# Patient Record
Sex: FEMALE | Race: WHITE | NOT HISPANIC OR LATINO | Employment: FULL TIME | URBAN - METROPOLITAN AREA
[De-identification: names, ages, dates, MRNs, and addresses within clinical notes are randomized per-mention and may not be internally consistent; named-entity substitution may affect disease eponyms.]

---

## 2017-01-10 DIAGNOSIS — K21.9 GASTROESOPHAGEAL REFLUX DISEASE, ESOPHAGITIS PRESENCE NOT SPECIFIED: Primary | ICD-10-CM

## 2017-01-10 NOTE — TELEPHONE ENCOUNTER
omeprazole (PRILOSEC) 20 MG CR capsule      Last Written Prescription Date: 12-9-2016  Last Fill Quantity: 30,  # refills: 0   Last Office Visit with [facility], [facility] or [facility] prescribing provider: 9-

## 2017-01-13 NOTE — TELEPHONE ENCOUNTER
My chart message sent for patient to schedule an apt anup period already given.  Jo Parikh,Clinic Rn  [facility]

## 2017-02-03 ENCOUNTER — OFFICE VISIT (OUTPATIENT)
Dept: FAMILY MEDICINE | Facility: CLINIC | Age: 42
End: 2017-02-03
Payer: COMMERCIAL

## 2017-02-03 VITALS
RESPIRATION RATE: 20 BRPM | HEART RATE: 76 BPM | BODY MASS INDEX: 32.58 KG/M2 | HEIGHT: 67 IN | WEIGHT: 207.6 LBS | TEMPERATURE: 98.4 F | DIASTOLIC BLOOD PRESSURE: 74 MMHG | SYSTOLIC BLOOD PRESSURE: 126 MMHG

## 2017-02-03 DIAGNOSIS — E66.09 NON MORBID OBESITY DUE TO EXCESS CALORIES: ICD-10-CM

## 2017-02-03 DIAGNOSIS — F17.200 TOBACCO USE DISORDER: ICD-10-CM

## 2017-02-03 DIAGNOSIS — K21.9 GASTROESOPHAGEAL REFLUX DISEASE, ESOPHAGITIS PRESENCE NOT SPECIFIED: Primary | ICD-10-CM

## 2017-02-03 DIAGNOSIS — Z23 NEED FOR HEPATITIS A AND B VACCINATION: ICD-10-CM

## 2017-02-03 PROCEDURE — 99213 OFFICE O/P EST LOW 20 MIN: CPT | Mod: 25 | Performed by: PHYSICIAN ASSISTANT

## 2017-02-03 PROCEDURE — 90471 IMMUNIZATION ADMIN: CPT | Performed by: PHYSICIAN ASSISTANT

## 2017-02-03 PROCEDURE — 90636 HEP A/HEP B VACC ADULT IM: CPT | Performed by: PHYSICIAN ASSISTANT

## 2017-02-03 NOTE — NURSING NOTE
"Chief Complaint   Patient presents with     Recheck Medication     Weight Problem     Patient wondering if you'll prescribe medication to help with weight loss-  Phentermine     Travel Clinic     Travel questions and immunizations       Initial /74 mmHg  Pulse 76  Temp(Src) 98.4  F (36.9  C) (Oral)  Resp 20  Ht 5' 6.5\" (1.689 m)  Wt 207 lb 9.6 oz (94.167 kg)  BMI 33.01 kg/m2  LMP 01/20/2017 Estimated body mass index is 33.01 kg/(m^2) as calculated from the following:    Height as of this encounter: 5' 6.5\" (1.689 m).    Weight as of this encounter: 207 lb 9.6 oz (94.167 kg).  BP completed using cuff size: large    "

## 2017-02-03 NOTE — PROGRESS NOTES
"  SUBJECTIVE:                                                    Tamika Chapin is a 41 year old female who presents to clinic today for the following health issues:      Medication Followup of Omeprazole    Taking Medication as prescribed: yes    Side Effects:  None    Medication Helping Symptoms:  Yes    Taken everyday pretty consistently since visit 9/2016. She states it is very helpful. When she has tried stopping the medication, her symptoms return.        Patient has long history of smoking.She wants to try to quit at the end of this month.  SHe is interested in using the quit plan.    Patient is [place] of [facility] in the next 1 month.    Patient is interested in using medication, phentermine, for weight loss. Her spouse is using and it helped him kick start his diet.    -------------------------------------    Problem list, Medication list, Allergies, and Medical/Social/Surgical histories reviewed in EPIC and updated as appropriate.    ROS:  A pertinent ROS of the General  HEENT  Cardiovascular  Pulmonary  GI systems is otherwise unremarkable.      OBJECTIVE:                                                    /74 mmHg  Pulse 76  Temp(Src) 98.4  F (36.9  C) (Oral)  Resp 20  Ht 5' 6.5\" (1.689 m)  Wt 207 lb 9.6 oz (94.167 kg)  BMI 33.01 kg/m2  LMP 01/20/2017 .  GENERAL: healthy, alert and no distress    Diagnostic test results:  Diagnostic Test Results:  none      ASSESSMENT/PLAN:                                                        1. Gastroesophageal reflux disease, esophagitis presence not specified  Patient Instructions   1. GERD (Acid reflux)  Try switching to over-the-counter Zantac 75 mg twice daily. If this doesn't help and you end up quitting smoking and this doesn't help then I would recommend a test called an endoscopy. (You may call insurance to see about cost).  - Consider dietary changes:   No chocolate, heavy dairy products, red meat, fried foods, alcohol, " caffeine, soda, smoking  - omeprazole (PRILOSEC) 20 MG CR capsule; Take 1 capsule (20 mg) by mouth daily  Dispense: 30 capsule; Refill: 0    2. Non morbid obesity due to excess calories  3. Phentermine   Please check with insurance on coverage, but see Dr. Wiley Stock [facility] Medicine:  [phone]     3. Tobacco use disorder  Not ready to quit today, will Let me know when/if you would like QUIT  Plan referral      4. Need for hepatitis A and B vaccination  - HEPA/HEPB VACCINE ADULT IM    Follow up with Provider - 1 month.         Susan Griffin PA-C  [facility]

## 2017-02-03 NOTE — PATIENT INSTRUCTIONS
1. GERD (Acid reflux)  Try switching to over-the-counter Zantac 75 mg twice daily. If this doesn't help and you end up quitting smoking and this doesn't help then I would recommend a test called an endoscopy. (You may call insurance to see about cost).  - Consider dietary changes:   No chocolate, heavy dairy products, red meat, fried foods, alcohol, caffeine, soda, smoking    2. Smoking  Let me know when/if you would like QUIT  Plan referral    3. Phentermine   Please check with insurance on coverage, but see Dr. Wiley Stock [facility] Medicine:  [phone]

## 2017-02-03 NOTE — MR AVS SNAPSHOT
After Visit Summary   2/3/2017    Tamika Chapin    MRN: [id number]           Patient Information     Date Of Birth          1975        Visit Information        Provider Department      2/3/2017 11:00 AM Susan Griffin PA-C [facility]        Today's Diagnoses     Gastroesophageal reflux disease, esophagitis presence not specified    -  1       Care Instructions    1. GERD (Acid reflux)  Try switching to over-the-counter Zantac 75 mg twice daily. If this doesn't help and you end up quitting smoking and this doesn't help then I would recommend a test called an endoscopy. (You may call insurance to see about cost).  - Consider dietary changes:   No chocolate, heavy dairy products, red meat, fried foods, alcohol, caffeine, soda, smoking    2. Smoking  Let me know when/if you would like QUIT  Plan referral    3. Phentermine   Please check with insurance on coverage, but see Dr. Wiley Stock [facility] Medicine:  [phone]         Follow-ups after your visit        Who to contact     If you have questions or need follow up information about today's clinic visit or your schedule please contact [facility] directly at [phone].  Normal or non-critical lab and imaging results will be communicated to you by MyChart, letter or phone within 4 business days after the clinic has received the results. If you do not hear from us within 7 days, please contact the clinic through [employer] or phone. If you have a critical or abnormal lab result, we will notify you by phone as soon as possible.  Submit refill requests through [employer] or call your pharmacy and they will forward the refill request to us. Please allow 3 business days for your refill to be completed.          Additional Information About Your Visit        [employer] Information     [employer] gives you secure access to your electronic health record. If you see a primary care provider, you can also send messages to your  "care team and make appointments. If you have questions, please call your primary care clinic.  If you do not have a primary care provider, please call [phone] and they will assist you.        Care EveryWhere ID     This is your Care EveryWhere ID. This could be used by other organizations to access your [place] medical records  [id number]        Your Vitals Were     Pulse Temperature Respirations Height BMI (Body Mass Index) Last Period    76 98.4  F (36.9  C) (Oral) 20 5' 6.5\" (1.689 m) 33.01 kg/m2 01/20/2017       Blood Pressure from Last 3 Encounters:   02/03/17 126/74   09/26/16 124/72   02/02/16 106/75    Weight from Last 3 Encounters:   02/03/17 207 lb 9.6 oz (94.167 kg)   09/26/16 221 lb (100.245 kg)   10/02/15 218 lb (98.884 kg)              Today, you had the following     No orders found for display       Primary Care Provider Office Phone # Fax #    Susan Griffin PA-C [phone]       [facility] [address] [place] [address]        Thank you!     Thank you for choosing [facility]  for your care. Our goal is always to provide you with excellent care. Hearing back from our patients is one way we can continue to improve our services. Please take a few minutes to complete the written survey that you may receive in the mail after your visit with us. Thank you!             Your Updated Medication List - Protect others around you: Learn how to safely use, store and throw away your medicines at www.disposemymeds.org.          This list is accurate as of: 2/3/17 11:55 AM.  Always use your most recent med list.                   Brand Name Dispense Instructions for use    omeprazole 20 MG CR capsule    priLOSEC    30 capsule    Take 1 capsule (20 mg) by mouth daily **Due for follow up visit for further refills**         "

## 2017-05-09 ENCOUNTER — TELEPHONE (OUTPATIENT)
Dept: FAMILY MEDICINE | Facility: CLINIC | Age: 42
End: 2017-05-09

## 2017-05-09 DIAGNOSIS — K21.9 GASTROESOPHAGEAL REFLUX DISEASE, ESOPHAGITIS PRESENCE NOT SPECIFIED: ICD-10-CM

## 2017-05-09 DIAGNOSIS — R10.13 ABDOMINAL PAIN, EPIGASTRIC: Primary | ICD-10-CM

## 2017-05-09 NOTE — TELEPHONE ENCOUNTER
Reason for Call: Request for an order or referral:    Order or referral being requested: Endoscopy    Date needed: as soon as possible    Has the patient been seen by the PCP for this problem? YES    Additional comments: Patient states that at her 02/2017 visit with Susan Griffin PA-C, Susan suggested that patient may want to pursue endoscopy. Patient did try taking the Zantac as suggested and it didn't help much. Patient has not quit smoking. She has been feeling more pain in her upper abdomen, has had more reflux, burping, and definitely correlates with after she eats food.     Phone number Patient can be reached at:  Home number on file [phone] (home)    Best Time:  any    Can we leave a detailed message on this number?  YES    Call taken on 5/9/2017 at 12:16 PM by Vickie Boudreaux

## 2017-05-09 NOTE — TELEPHONE ENCOUNTER
Route for a GI referral for a endoscopy. 2/3 OV mentions this if no improvement. (If a visit is requested, please let me know what type or when you'd like to chat with her.)  Zunilda Kiser RN

## 2017-05-10 NOTE — TELEPHONE ENCOUNTER
Endoscopy referral placed. She should plan to follow up with alternative provider after the procedure as PCP will be out    BUTCH Espinosa, PA-C  [facility]

## 2017-06-16 ENCOUNTER — TRANSFERRED RECORDS (OUTPATIENT)
Dept: HEALTH INFORMATION MANAGEMENT | Facility: CLINIC | Age: 42
End: 2017-06-16

## 2018-07-02 ENCOUNTER — HEALTH MAINTENANCE LETTER (OUTPATIENT)
Age: 43
End: 2018-07-02

## 2019-09-17 ENCOUNTER — DOCUMENTATION ONLY (OUTPATIENT)
Dept: LAB | Facility: CLINIC | Age: 44
End: 2019-09-17

## 2019-09-17 DIAGNOSIS — Z13.29 SCREENING FOR THYROID DISORDER: ICD-10-CM

## 2019-09-17 DIAGNOSIS — Z13.6 CARDIOVASCULAR SCREENING; LDL GOAL LESS THAN 160: ICD-10-CM

## 2019-09-17 DIAGNOSIS — Z00.00 ENCOUNTER FOR ROUTINE ADULT HEALTH EXAMINATION WITHOUT ABNORMAL FINDINGS: Primary | ICD-10-CM

## 2019-09-17 DIAGNOSIS — R79.89 ELEVATED LFTS: ICD-10-CM

## 2019-09-17 DIAGNOSIS — Z13.1 SCREENING FOR DIABETES MELLITUS (DM): ICD-10-CM

## 2019-09-17 NOTE — PROGRESS NOTES
Next 5 appointments (look out 90 days)    Sep 19, 2019  9:10 AM CDT  PHYSICAL with LIV Heath CNP  [facility] ([facility] [id number] [place] [id number]  [phone]

## 2019-09-19 ENCOUNTER — OFFICE VISIT (OUTPATIENT)
Dept: PEDIATRICS | Facility: CLINIC | Age: 44
End: 2019-09-19
Payer: COMMERCIAL

## 2019-09-19 ENCOUNTER — RESULT FOLLOW UP (OUTPATIENT)
Dept: PEDIATRICS | Facility: CLINIC | Age: 44
End: 2019-09-19

## 2019-09-19 VITALS
HEIGHT: 67 IN | BODY MASS INDEX: 34.28 KG/M2 | DIASTOLIC BLOOD PRESSURE: 60 MMHG | HEART RATE: 85 BPM | WEIGHT: 218.4 LBS | SYSTOLIC BLOOD PRESSURE: 100 MMHG | TEMPERATURE: 97.1 F | OXYGEN SATURATION: 98 %

## 2019-09-19 DIAGNOSIS — Z00.00 ROUTINE GENERAL MEDICAL EXAMINATION AT A HEALTH CARE FACILITY: Primary | ICD-10-CM

## 2019-09-19 DIAGNOSIS — R87.810 CERVICAL HIGH RISK HPV (HUMAN PAPILLOMAVIRUS) TEST POSITIVE: ICD-10-CM

## 2019-09-19 DIAGNOSIS — Z13.1 SCREENING FOR DIABETES MELLITUS (DM): ICD-10-CM

## 2019-09-19 DIAGNOSIS — R35.0 URINARY FREQUENCY: ICD-10-CM

## 2019-09-19 DIAGNOSIS — Z13.0 SCREENING FOR DISORDER OF BLOOD AND BLOOD-FORMING ORGANS: ICD-10-CM

## 2019-09-19 DIAGNOSIS — F17.218 CIGARETTE NICOTINE DEPENDENCE WITH OTHER NICOTINE-INDUCED DISORDER: ICD-10-CM

## 2019-09-19 DIAGNOSIS — Z12.4 SCREENING FOR MALIGNANT NEOPLASM OF CERVIX: ICD-10-CM

## 2019-09-19 DIAGNOSIS — R19.5 LOOSE STOOLS: ICD-10-CM

## 2019-09-19 DIAGNOSIS — R32 URINARY INCONTINENCE, UNSPECIFIED TYPE: ICD-10-CM

## 2019-09-19 DIAGNOSIS — Z13.6 CARDIOVASCULAR SCREENING; LDL GOAL LESS THAN 160: ICD-10-CM

## 2019-09-19 DIAGNOSIS — R82.90 NONSPECIFIC FINDING ON EXAMINATION OF URINE: ICD-10-CM

## 2019-09-19 DIAGNOSIS — Z12.39 ENCOUNTER FOR SCREENING BREAST EXAMINATION: ICD-10-CM

## 2019-09-19 DIAGNOSIS — Z00.00 ENCOUNTER FOR ROUTINE ADULT HEALTH EXAMINATION WITHOUT ABNORMAL FINDINGS: ICD-10-CM

## 2019-09-19 DIAGNOSIS — Z13.29 SCREENING FOR THYROID DISORDER: ICD-10-CM

## 2019-09-19 LAB
ALBUMIN SERPL-MCNC: 3.8 G/DL (ref 3.4–5)
ALBUMIN UR-MCNC: NEGATIVE MG/DL
ALP SERPL-CCNC: 94 U/L (ref 40–150)
ALT SERPL W P-5'-P-CCNC: 57 U/L (ref 0–50)
ANION GAP SERPL CALCULATED.3IONS-SCNC: 7 MMOL/L (ref 3–14)
APPEARANCE UR: CLEAR
AST SERPL W P-5'-P-CCNC: 34 U/L (ref 0–45)
BACTERIA #/AREA URNS HPF: ABNORMAL /HPF
BILIRUB SERPL-MCNC: 0.8 MG/DL (ref 0.2–1.3)
BILIRUB UR QL STRIP: NEGATIVE
BUN SERPL-MCNC: 12 MG/DL (ref 7–30)
CALCIUM SERPL-MCNC: 8.6 MG/DL (ref 8.5–10.1)
CHLORIDE SERPL-SCNC: 107 MMOL/L (ref 94–109)
CHOLEST SERPL-MCNC: 206 MG/DL
CO2 SERPL-SCNC: 26 MMOL/L (ref 20–32)
COLOR UR AUTO: ABNORMAL
CREAT SERPL-MCNC: 0.74 MG/DL (ref 0.52–1.04)
DEPRECATED CALCIDIOL+CALCIFEROL SERPL-MC: 24 UG/L (ref 20–75)
ERYTHROCYTE [DISTWIDTH] IN BLOOD BY AUTOMATED COUNT: 12.9 % (ref 10–15)
GFR SERPL CREATININE-BSD FRML MDRD: >90 ML/MIN/{1.73_M2}
GLUCOSE SERPL-MCNC: 113 MG/DL (ref 70–99)
GLUCOSE UR STRIP-MCNC: NEGATIVE MG/DL
HCT VFR BLD AUTO: 40.6 % (ref 35–47)
HDLC SERPL-MCNC: 38 MG/DL
HGB BLD-MCNC: 13.2 G/DL (ref 11.7–15.7)
HGB UR QL STRIP: NEGATIVE
KETONES UR STRIP-MCNC: NEGATIVE MG/DL
LDLC SERPL CALC-MCNC: 133 MG/DL
LEUKOCYTE ESTERASE UR QL STRIP: NEGATIVE
MCH RBC QN AUTO: 28.9 PG (ref 26.5–33)
MCHC RBC AUTO-ENTMCNC: 32.5 G/DL (ref 31.5–36.5)
MCV RBC AUTO: 89 FL (ref 78–100)
NITRATE UR QL: NEGATIVE
NON-SQ EPI CELLS #/AREA URNS LPF: ABNORMAL /LPF
NONHDLC SERPL-MCNC: 168 MG/DL
PH UR STRIP: 5.5 PH (ref 5–7)
PLATELET # BLD AUTO: 297 10E9/L (ref 150–450)
POTASSIUM SERPL-SCNC: 3.7 MMOL/L (ref 3.4–5.3)
PROT SERPL-MCNC: 7.4 G/DL (ref 6.8–8.8)
RBC # BLD AUTO: 4.56 10E12/L (ref 3.8–5.2)
RBC #/AREA URNS AUTO: ABNORMAL /HPF
SODIUM SERPL-SCNC: 140 MMOL/L (ref 133–144)
SOURCE: ABNORMAL
SP GR UR STRIP: 1 (ref 1–1.03)
TRIGL SERPL-MCNC: 174 MG/DL
TSH SERPL DL<=0.005 MIU/L-ACNC: 1.19 MU/L (ref 0.4–4)
UROBILINOGEN UR STRIP-MCNC: NORMAL MG/DL (ref 0–2)
WBC # BLD AUTO: 8.3 10E9/L (ref 4–11)
WBC #/AREA URNS AUTO: ABNORMAL /HPF

## 2019-09-19 PROCEDURE — 85027 COMPLETE CBC AUTOMATED: CPT | Performed by: NURSE PRACTITIONER

## 2019-09-19 PROCEDURE — G0123 SCREEN CERV/VAG THIN LAYER: HCPCS | Performed by: NURSE PRACTITIONER

## 2019-09-19 PROCEDURE — 81001 URINALYSIS AUTO W/SCOPE: CPT | Performed by: NURSE PRACTITIONER

## 2019-09-19 PROCEDURE — 36415 COLL VENOUS BLD VENIPUNCTURE: CPT | Performed by: NURSE PRACTITIONER

## 2019-09-19 PROCEDURE — 87624 HPV HI-RISK TYP POOLED RSLT: CPT | Performed by: NURSE PRACTITIONER

## 2019-09-19 PROCEDURE — 82306 VITAMIN D 25 HYDROXY: CPT | Performed by: NURSE PRACTITIONER

## 2019-09-19 PROCEDURE — 80061 LIPID PANEL: CPT | Performed by: NURSE PRACTITIONER

## 2019-09-19 PROCEDURE — 87086 URINE CULTURE/COLONY COUNT: CPT | Performed by: NURSE PRACTITIONER

## 2019-09-19 PROCEDURE — 84443 ASSAY THYROID STIM HORMONE: CPT | Performed by: NURSE PRACTITIONER

## 2019-09-19 PROCEDURE — 99396 PREV VISIT EST AGE 40-64: CPT | Performed by: NURSE PRACTITIONER

## 2019-09-19 PROCEDURE — 83516 IMMUNOASSAY NONANTIBODY: CPT | Mod: 59 | Performed by: NURSE PRACTITIONER

## 2019-09-19 PROCEDURE — 99213 OFFICE O/P EST LOW 20 MIN: CPT | Mod: 25 | Performed by: NURSE PRACTITIONER

## 2019-09-19 PROCEDURE — 80053 COMPREHEN METABOLIC PANEL: CPT | Performed by: NURSE PRACTITIONER

## 2019-09-19 RX ORDER — BUPROPION HYDROCHLORIDE 150 MG/1
150 TABLET, EXTENDED RELEASE ORAL 2 TIMES DAILY
Qty: 60 TABLET | Refills: 6 | Status: SHIPPED | OUTPATIENT
Start: 2019-09-19 | End: 2020-10-22

## 2019-09-19 ASSESSMENT — MIFFLIN-ST. JEOR: SCORE: 1670.35

## 2019-09-19 NOTE — NURSING NOTE
"Chief Complaint   Patient presents with     Physical     AFE       Initial /60 (BP Location: Right arm, Patient Position: Sitting, Cuff Size: Adult Regular)   Pulse 85   Temp 97.1  F (36.2  C) (Temporal)   Ht 1.689 m (5' 6.5\")   Wt 99.1 kg (218 lb 6.4 oz)   LMP  (LMP Unknown)   SpO2 98%   Breastfeeding? No   BMI 34.72 kg/m   Estimated body mass index is 34.72 kg/m  as calculated from the following:    Height as of this encounter: 1.689 m (5' 6.5\").    Weight as of this encounter: 99.1 kg (218 lb 6.4 oz).  Medication Reconciliation: complete      LUIS FELIPE William      "

## 2019-09-19 NOTE — PROGRESS NOTES
SUBJECTIVE:   CC: Tamika Chapin is an 44 year old woman who presents for preventive health visit.     Healthy Habits:    Do you get at least three servings of calcium containing foods daily (dairy, green leafy vegetables, etc.)? yes    Amount of exercise or daily activities, outside of work: 10 mins daily    Problems taking medications regularly not applicable    Medication side effects: No    Have you had an eye exam in the past two years? yes    Do you see a dentist twice per year? yes    Do you have sleep apnea, excessive snoring or daytime drowsiness?yes      1. Discuss vitamin profile.one of her sisters mentioned a vitamin profile   2. Smoking cessation. Has done Chantix in the past. Has not zyban before   3. Discuss IBS symptoms, has been ongoing for several years    Acute Illness   Acute illness concerns: cough  Onset: 1 week    Fever: no    Chills/Sweats: no    Headache (location?): no    Sinus Pressure:no    Conjunctivitis:  no    Ear Pain: no    Rhinorrhea: no    Congestion: YES    Sore Throat: no     Cough: YES-productive of clear sputum, productive of yellow sputum    Wheeze: no    Decreased Appetite: no    Nausea: no    Vomiting: no    Diarrhea:  no    Dysuria/Freq.: no    Fatigue/Achiness: no    Sick/Strep Exposure: no     Therapies Tried and outcome: nyquil      Today's PHQ-2 Score:   PHQ-2 ( 1999 Pfizer) 9/19/2019 9/26/2016   Q1: Little interest or pleasure in doing things 0 0   Q2: Feeling down, depressed or hopeless 0 0   PHQ-2 Score 0 0       Abuse: Current or Past(Physical, Sexual or Emotional)- No  Do you feel safe in your environment? Yes    Social History     Tobacco Use     Smoking status: Current Every Day Smoker     Packs/day: 0.50     Years: 23.00     Pack years: 11.50     Types: Cigarettes     Smokeless tobacco: Never Used     Tobacco comment: 1 cig occas   Substance Use Topics     Alcohol use: Yes     Comment: Occ. few times a month - couple of drinks     If you drink alcohol do you  typically have >3 drinks per day or >7 drinks per week? No                     Reviewed orders with patient.  Reviewed health maintenance and updated orders accordingly - Yes  Lab work is in process  Labs reviewed in EPIC  BP Readings from Last 3 Encounters:   09/19/19 100/60   02/03/17 126/74   09/26/16 124/72    Wt Readings from Last 3 Encounters:   09/19/19 99.1 kg (218 lb 6.4 oz)   02/03/17 94.2 kg (207 lb 9.6 oz)   09/26/16 100.2 kg (221 lb)                  Patient Active Problem List   Diagnosis     Tobacco use disorder     Hyperlipidemia LDL goal <160     ASCUS on Pap smear     Obesity     Past Surgical History:   Procedure Laterality Date     C APPENDECTOMY  07/1995       Social History     Tobacco Use     Smoking status: Current Every Day Smoker     Packs/day: 0.50     Years: 23.00     Pack years: 11.50     Types: Cigarettes     Smokeless tobacco: Never Used     Tobacco comment: 1 cig occas   Substance Use Topics     Alcohol use: Yes     Comment: Occ. few times a month - couple of drinks     Family History   Problem Relation Age of Onset     Diabetes Father      Hypertension Father      Respiratory Maternal Grandmother         emphysema     Diabetes Maternal Grandmother      Cancer Paternal Grandmother         lung     Other Cancer Paternal Grandmother      Depression Sister          Current Outpatient Medications   Medication Sig Dispense Refill     omeprazole (PRILOSEC) 20 MG CR capsule Take 1 capsule (20 mg) by mouth daily 30 capsule 0     No Known Allergies    Mammogram Screening: Patient under age 50, mutual decision reflected in health maintenance.    Pertinent mammograms are reviewed under the imaging tab.  History of abnormal Pap smear: NO - age 30- 65 PAP every 3 years recommended  PAP / HPV Latest Ref Rng & Units 9/19/2019 4/30/2015 2/5/2014   PAP - NIL NIL NIL   HPV 16 DNA NEG:Negative Negative Negative -   HPV 18 DNA NEG:Negative Negative Negative -   OTHER HR HPV NEG:Negative Positive(A)  Negative -     Reviewed and updated as needed this visit by clinical staff  Tobacco  Allergies  Meds  Med Hx  Surg Hx  Fam Hx  Soc Hx        Reviewed and updated as needed this visit by Provider        Past Medical History:   Diagnosis Date     Cervical high risk HPV (human papillomavirus) test positive 09/19/2019    See problem list      Past Surgical History:   Procedure Laterality Date     C APPENDECTOMY  07/1995       ROS:  CONSTITUTIONAL:NEGATIVE for fever, chills, change in weight  INTEGUMENTARY/SKIN: NEGATIVE for worrisome rashes, moles or lesions  EYES: NEGATIVE for vision changes or irritation  ENT: POSITIVE for nasal congestion and postnasal drainage and NEGATIVE for fever, sinus pressure, sore throat and vertigo  Upper URI going on about a week but seems to be getting better.   RESP:POSITIVE for cough-non productive and NEGATIVE for hemoptysis, Hx asthma, SOB/dyspnea and wheezing  BREAST: NEGATIVE for masses, tenderness or discharge  CV: NEGATIVE for chest pain, palpitations or peripheral edema  GI: POSITIVE for states feels like urgency issues and will not be able to get to the bathroom at times. Has been going on for years.  and NEGATIVE for jaundice, melena, nausea, vomiting and weight loss   female: normal menses, no unusual vaginal symptoms and does have urinary urgency and frequency for years   Has not tried nothing and has never had any children   MUSCULOSKELETAL:POSITIVE  for joint pain in both knees intermittently. Does not give out from under her.  and NEGATIVE for joint swelling  and joint warmth   NEURO: NEGATIVE for weakness, dizziness or paresthesias  ENDOCRINE: NEGATIVE for temperature intolerance, skin/hair changes  HEME/ALLERGY/IMMUNE: NEGATIVE for bleeding problems  PSYCHIATRIC: POSITIVE forHx anxiety and insomnia and seems to be aggravated by her work.  and NEGATIVE foralcohol abuse, drug usage, thoughts of hurting someone else and thoughts of self harm     OBJECTIVE:   BP  "100/60 (BP Location: Right arm, Patient Position: Sitting, Cuff Size: Adult Regular)   Pulse 85   Temp 97.1  F (36.2  C) (Temporal)   Ht 1.689 m (5' 6.5\")   Wt 99.1 kg (218 lb 6.4 oz)   LMP  (LMP Unknown)   SpO2 98%   Breastfeeding? No   BMI 34.72 kg/m     Wt Readings from Last 4 Encounters:   09/19/19 99.1 kg (218 lb 6.4 oz)   02/03/17 94.2 kg (207 lb 9.6 oz)   09/26/16 100.2 kg (221 lb)   10/02/15 98.9 kg (218 lb)       EXAM:  GENERAL: healthy, alert and no distress  EYES: Eyes grossly normal to inspection and conjunctivae and sclerae normal  HENT: normal cephalic/atraumatic, ear canals and TM's normal, nose and mouth without ulcers or lesions, rhinorrhea clear, oropharynx clear and oral mucous membranes moist  NECK: no adenopathy, no asymmetry, masses, or scars and thyroid normal to palpation  RESP: lungs clear to auscultation - no rales, rhonchi or wheezes  BREAST: normal without masses, tenderness or nipple discharge and no palpable axillary masses or adenopathy  CV: regular rates and rhythm, no murmur, click or rub, peripheral pulses strong and no peripheral edema  ABDOMEN: soft, nontender, no hepatosplenomegaly, no masses and bowel sounds normal   (female): normal female external genitalia, normal urethral meatus, vaginal mucosa pink, moist, well rugated, and normal cervix/adnexa/uterus without masses or discharge  MS: no gross musculoskeletal defects noted, no edema  SKIN: no suspicious lesions or rashes  NEURO: Normal strength and tone, mentation intact and speech normal  PSYCH: mentation appears normal, affect normal/bright  LYMPH: no cervical, supraclavicular, axillary, or inguinal adenopathy    Diagnostic Test Results:  Results for orders placed or performed in visit on 09/19/19   UA with Microscopic reflex to Culture   Result Value Ref Range    Color Urine Straw     Appearance Urine Clear     Glucose Urine Negative NEG^Negative mg/dL    Bilirubin Urine Negative NEG^Negative    Ketones Urine " Negative NEG^Negative mg/dL    Specific Gravity Urine 1.001 (L) 1.003 - 1.035    Blood Urine Negative NEG^Negative    pH Urine 5.5 5.0 - 7.0 pH    Protein Albumin Urine Negative NEG^Negative mg/dL    Urobilinogen mg/dL Normal 0.0 - 2.0 mg/dL    Nitrite Urine Negative NEG^Negative    Leukocyte Esterase Urine Negative NEG^Negative    Source Midstream Urine     WBC Urine 0 - 5 OTO5^0 - 5 /HPF    RBC Urine O - 2 OTO2^O - 2 /HPF    Bacteria Urine Moderate (A) NEG^Negative /HPF    Squamous Epithelial /LPF Urine Few FEW^Few /LPF   HPV High Risk Types DNA Cervical   Result Value Ref Range    HPV Source [facility]     HPV 16 DNA Negative NEG^Negative    HPV 18 DNA Negative NEG^Negative    Other HR HPV Positive (A) NEG^Negative    Final Diagnosis       This patient's sample is positive for other HR HPV DNA (types 31, 33, 35, 39, 45, 51, 52,   56, 58, 59, 66 or 68), not HPV 16 or HPV 18 DNA. This result requires clinical correlation   with concurrent cytology findings.      Specimen Description Cervical Cells    Vitamin D Deficiency   Result Value Ref Range    Vitamin D Deficiency screening 24 20 - 75 ug/L   CBC with platelets   Result Value Ref Range    WBC 8.3 4.0 - 11.0 10e9/L    RBC Count 4.56 3.8 - 5.2 10e12/L    Hemoglobin 13.2 11.7 - 15.7 g/dL    Hematocrit 40.6 35.0 - 47.0 %    MCV 89 78 - 100 fl    MCH 28.9 26.5 - 33.0 pg    MCHC 32.5 31.5 - 36.5 g/dL    RDW 12.9 10.0 - 15.0 %    Platelet Count 297 150 - 450 10e9/L   Tissue transglutaminase gaurav IgA and IgG   Result Value Ref Range    Tissue Transglutaminase Antibody IgA 1 <7 U/mL    Tissue Transglutaminase Gaurav IgG <1 <7 U/mL   A pap thin layer screen   Result Value Ref Range    PAP NIL     Copath Report         Patient Name: RAMESH STONE  MR#: [id number]  Specimen #: [id number]  Collected: 9/19/2019  Received: 9/19/2019  Reported: 9/24/2019 08:01  Ordering Phy(s): HAWA NAGEL    For improved result formatting, select 'View Enhanced Report Format' under    Linked Documents section.    SPECIMEN/STAIN PROCESS:  Pap thin layer prep screening ([facility])       Pap-Cyto x 1, HPV ordered x 1    SOURCE: Cervical, endocervical  ----------------------------------------------------------------   Pap thin layer prep screening ([facility])  SPECIMEN ADEQUACY:  Satisfactory for evaluation.  Specimen was unable to be imaged on the   [employer] Slide .  -Transformation zone component absent.    CYTOLOGIC INTERPRETATION:    Negative for intraepithelial lesion or malignancy    Electronically signed out by:  VIOLET Stanton (ASCP)    CLINICAL HISTORY:    A previous normal pap  Date of Last Pap: 4/30/2015,    Papanicolaou Test Limitations:  Cervical cytology is a screening test with   limi keaton sensitivity; regular  screening is critical for cancer prevention; Pap tests are primarily   effective for the diagnosis/prevention of  squamous cell carcinoma, not adenocarcinomas or other cancers.    The technical component of this testing was completed at the [facility], with the professional component performed   at the [facility], [address] [place] [id number] ([phone])    COLLECTION SITE:  Client[place]  [facility]  [place] [facility] [place] [facility] [place]       Urine Culture Aerobic Bacterial   Result Value Ref Range    Specimen Description Midstream Urine     Culture Micro       10,000 to 50,000 colonies/mL  mixed urogenital ayah           ASSESSMENT/PLAN:   Tamika was seen today for physical.    Diagnoses and all orders for this visit:    Routine general medical examination at a health care facility  -     Vitamin D Deficiency    Urinary frequency  -     UA with Microscopic reflex to Culture    Encounter for screening breast examination  -      *MA Screening Digital Bilateral; Future    Screening for malignant neoplasm of cervix  -  "    Pap imaged thin layer screen with HPV - recommended age 30 - 65 years (select HPV order below)  -     HPV High Risk Types DNA Cervical  -     A pap thin layer screen    Cigarette nicotine dependence with other nicotine-induced disorder  -     buPROPion (WELLBUTRIN SR) 150 MG 12 hr tablet; Take 1 tablet (150 mg) by mouth 2 times daily    Screening for disorder of blood and blood-forming organs  -     CBC with platelets    Nonspecific finding on examination of urine  -     Urine Culture Aerobic Bacterial    Loose stools  -     Tissue transglutaminase gaurav IgA and IgG    Urinary incontinence, unspecified type  -     UROLOGY ADULT REFERRAL    PLAN:   Patient needs to follow up in if no improvement,or sooner if worsening of symptoms or other symptoms develop.  Mammogram   CONSULTATION/REFERRAL to urology   Please call [phone] to make appointment  if you do not hear from referrals in the next few days.   Work on weight loss  Regular exercise  Will follow up and/or notify patient of  results via My Chart to determine further need for followup  Follow up office visit in one year for annual health maintenance exam, sooner PRN.    COUNSELING:   Reviewed preventive health counseling, as reflected in patient instructions  Special attention given to:        Regular exercise       Healthy diet/nutrition       Vision screening       Osteoporosis Prevention/Bone Health       The 10-year ASCVD risk score (Zachary ELVA Jr., et al., 2013) is: 4.2%    Values used to calculate the score:      Age: 44 years      Sex: Female      Is Non- : No      Diabetic: No      Tobacco smoker: Yes      Systolic Blood Pressure: 115 mmHg      Is BP treated: No      HDL Cholesterol: 38 mg/dL      Total Cholesterol: 206 mg/dL    Estimated body mass index is 34.72 kg/m  as calculated from the following:    Height as of this encounter: 1.689 m (5' 6.5\").    Weight as of this encounter: 99.1 kg (218 lb 6.4 oz).    Weight management " plan: Discussed healthy diet and exercise guidelines     reports that she has been smoking cigarettes.  She has a 11.50 pack-year smoking history. She has never used smokeless tobacco.  Tobacco Cessation Action Plan: Pharmacotherapies : Zyban/Wellbutrin    Counseling Resources:  ATP IV Guidelines  Pooled Cohorts Equation Calculator  Breast Cancer Risk Calculator  FRAX Risk Assessment  ICSI Preventive Guidelines  Dietary Guidelines for Americans, 2010  USDA's MyPlate  ASA Prophylaxis  Lung CA Screening    LIV Heath CNP  [facility]

## 2019-09-19 NOTE — PATIENT INSTRUCTIONS
PLAN:   1.   Symptomatic therapy suggested: rest, increase fluids, OTC Robitussin DM and call prn if symptoms persist or worsen.  Increase calcium to 1000mg and 800iu Vit D  Will try Wellbutrin to help with smoking cessation   2.  Orders Placed This Encounter   Medications     buPROPion (WELLBUTRIN SR) 150 MG 12 hr tablet     Sig: Take 1 tablet (150 mg) by mouth 2 times daily     Dispense:  60 tablet     Refill:  6     Orders Placed This Encounter   Procedures     *MA Screening Digital Bilateral     UA with Microscopic reflex to Culture     Pap imaged thin layer screen with HPV - recommended age 30 - 65 years (select HPV order below)     HPV High Risk Types DNA Cervical     Vitamin D Deficiency     CBC with platelets     Tissue transglutaminase gaurav IgA and IgG     UROLOGY ADULT REFERRAL       3. Patient needs to follow up in if no improvement,or sooner if worsening of symptoms or other symptoms develop.  Mammogram   CONSULTATION/REFERRAL to urology   Please call [phone] to make appointment  if you do not hear from referrals in the next few days.   Work on weight loss  Regular exercise  Will follow up and/or notify patient of  results via My Chart to determine further need for followup  Follow up office visit in one year for annual health maintenance exam, sooner PRN.    Preventive Health Recommendations  Female Ages 40 to 49    Yearly exam:     See your health care provider every year in order to  1. Review health changes.   2. Discuss preventive care.    3. Review your medicines if your doctor prescribed any.      Get a Pap test every three years (unless you have an abnormal result and your provider advises testing more often).      If you get Pap tests with HPV test, you only need to test every 5 years, unless you have an abnormal result. You do not need a Pap test if your uterus was removed (hysterectomy) and you have not had cancer.      You should be tested each year for STDs (sexually transmitted  diseases), if you're at risk.     Ask your doctor if you should have a mammogram.      Have a colonoscopy (test for colon cancer) if someone in your family has had colon cancer or polyps before age 50.       Have a cholesterol test every 5 years.       Have a diabetes test (fasting glucose) after age 45. If you are at risk for diabetes, you should have this test every 3 years.    Shots: Get a flu shot each year. Get a tetanus shot every 10 years.     Nutrition:     Eat at least 5 servings of fruits and vegetables each day.    Eat whole-grain bread, whole-wheat pasta and brown rice instead of white grains and rice.    Get adequate Calcium and Vitamin D.      Lifestyle    Exercise at least 150 minutes a week (an average of 30 minutes a day, 5 days a week). This will help you control your weight and prevent disease.    Limit alcohol to one drink per day.    No smoking.     Wear sunscreen to prevent skin cancer.    See your dentist every six months for an exam and cleaning.

## 2019-09-20 LAB
BACTERIA SPEC CULT: NORMAL
SPECIMEN SOURCE: NORMAL
TTG IGA SER-ACNC: 1 U/ML
TTG IGG SER-ACNC: <1 U/ML

## 2019-09-20 NOTE — RESULT ENCOUNTER NOTE
Kael Chapin,    Attached are your test results.  -LDL(bad) cholesterol level is elevated, HDL(good) cholesterol level is low and your triglycerides are elevated which can increase your heart disease risk.  A diet high in fat and simple carbohydrates, genetics and being overweight can contribute to this. ADVISE: exercising 150 minutes of aerobic exercise per week (30 minutes for 5 days per week or 50 minutes for 3 days per week are options), eating a low saturated fat/low carbohydrate diet, and omega-3 fatty acids (fish oil)  daily are helpful to improve this. In 12 months, you should recheck your fasting cholesterol panel by scheduling a lab-only appointment.  -Liver and gallbladder tests (ALT,AST, Alk phos,bilirubin) are slightly elevated. ADVISE: further testing - will recheck not fasting in the next month with lab only appointment   -Kidney function (GFR) is normal.  -Sodium is normal.  -Potassium is normal.  -Calcium is normal.  -Glucose is slight elevated and may be a sign of early diabetes (prediabetes). ADVISE:: eating a low carbohydrate diet, exercising, trying to lose weight (if necessary) and rechecking your glucose level in 12 months.  -TSH (thyroid stimulating hormone) level is normal which indicates normal thyroid function.   Please contact us if you have any questions.    Dona Hannah, CNP

## 2019-09-20 NOTE — RESULT ENCOUNTER NOTE
Kael Chapin,    Attached are your test results.  -Normal red blood cell (hgb) levels, normal white blood cell count and normal platelet levels.  -Vitamin D level is mildly below  normal and getting 1000 IU daily in supplements is recommended.    Please contact us if you have any questions.    Dona Hannah, CNP

## 2019-09-20 NOTE — RESULT ENCOUNTER NOTE
Kael Chapin,    Attached are your test results.  -Urine culture is negative for a urinary tract infection     Please contact us if you have any questions.    Dona Hannah, CNP

## 2019-09-20 NOTE — RESULT ENCOUNTER NOTE
Kael Chapin,    Attached are your test results.  Celiac screen is negative    Please contact us if you have any questions.    Dona Hannah, CNP

## 2019-09-24 ENCOUNTER — OFFICE VISIT (OUTPATIENT)
Dept: PEDIATRICS | Facility: CLINIC | Age: 44
End: 2019-09-24
Payer: COMMERCIAL

## 2019-09-24 VITALS
BODY MASS INDEX: 34.53 KG/M2 | DIASTOLIC BLOOD PRESSURE: 70 MMHG | HEART RATE: 79 BPM | OXYGEN SATURATION: 96 % | TEMPERATURE: 98.3 F | SYSTOLIC BLOOD PRESSURE: 115 MMHG | WEIGHT: 217.2 LBS

## 2019-09-24 DIAGNOSIS — J20.9 BRONCHITIS WITH BRONCHOSPASM: Primary | ICD-10-CM

## 2019-09-24 LAB
COPATH REPORT: NORMAL
PAP: NORMAL

## 2019-09-24 PROCEDURE — 99213 OFFICE O/P EST LOW 20 MIN: CPT | Performed by: NURSE PRACTITIONER

## 2019-09-24 RX ORDER — PREDNISONE 20 MG/1
40 TABLET ORAL DAILY
Qty: 10 TABLET | Refills: 0 | Status: SHIPPED | OUTPATIENT
Start: 2019-09-24 | End: 2019-09-29

## 2019-09-24 RX ORDER — DOXYCYCLINE HYCLATE 100 MG
100 TABLET ORAL 2 TIMES DAILY
Qty: 14 TABLET | Refills: 0 | Status: SHIPPED | OUTPATIENT
Start: 2019-09-24 | End: 2019-10-04

## 2019-09-24 NOTE — NURSING NOTE
"Chief Complaint   Patient presents with     Cough     cough, SOB, wheezing started over the weekend       Initial /70 (BP Location: Right arm, Patient Position: Sitting, Cuff Size: Adult Regular)   Pulse 79   Temp 98.3  F (36.8  C) (Oral)   Wt 98.5 kg (217 lb 3.2 oz)   LMP  (LMP Unknown)   SpO2 96%   Breastfeeding? No   BMI 34.53 kg/m   Estimated body mass index is 34.53 kg/m  as calculated from the following:    Height as of 9/19/19: 1.689 m (5' 6.5\").    Weight as of this encounter: 98.5 kg (217 lb 3.2 oz).  Medication Reconciliation: complete      LUIS FELIPE William      "

## 2019-09-24 NOTE — PROGRESS NOTES
Subjective     Tamika Chapin is a 44 year old female who presents to clinic today for the following health issues:    HPI   Acute Illness   Acute illness concerns: cough  Onset: cough x 1 week, wheezing and SOB x 4-5 days    Fever: no    Chills/Sweats: YES    Headache (location?): YES- might be due to coughing    Sinus Pressure:no    Conjunctivitis:  no    Ear Pain: no    Rhinorrhea: no    Congestion: YES- chest congestion    Sore Throat: no     Cough: YES-non-productive, with shortness of breath    Wheeze: YES    Decreased Appetite: YES    Nausea: no    Vomiting: no    Diarrhea:  no    Dysuria/Freq.: no    Fatigue/Achiness: YES    Sick/Strep Exposure: no     Therapies Tried and outcome: robitussin dm cough  Has been cold symptoms which started 10 days ago then the cough started 6 days ago     Patient Active Problem List   Diagnosis     Tobacco use disorder     Hyperlipidemia LDL goal <160     ASCUS on Pap smear     Obesity     Past Surgical History:   Procedure Laterality Date     C APPENDECTOMY  07/1995       Social History     Tobacco Use     Smoking status: Current Every Day Smoker     Packs/day: 0.50     Years: 23.00     Pack years: 11.50     Types: Cigarettes     Smokeless tobacco: Never Used     Tobacco comment: 1 cig occas   Substance Use Topics     Alcohol use: Yes     Comment: Occ. few times a month - couple of drinks     Family History   Problem Relation Age of Onset     Diabetes Father      Hypertension Father      Respiratory Maternal Grandmother         emphysema     Diabetes Maternal Grandmother      Cancer Paternal Grandmother         lung     Other Cancer Paternal Grandmother      Depression Sister          Current Outpatient Medications   Medication Sig Dispense Refill     buPROPion (WELLBUTRIN SR) 150 MG 12 hr tablet Take 1 tablet (150 mg) by mouth 2 times daily 60 tablet 6     omeprazole (PRILOSEC) 20 MG CR capsule Take 1 capsule (20 mg) by mouth daily (Patient not taking: Reported on 9/24/2019)  30 capsule 0     No Known Allergies  BP Readings from Last 3 Encounters:   09/24/19 115/70   09/19/19 100/60   02/03/17 126/74    Wt Readings from Last 3 Encounters:   09/24/19 98.5 kg (217 lb 3.2 oz)   09/19/19 99.1 kg (218 lb 6.4 oz)   02/03/17 94.2 kg (207 lb 9.6 oz)              Reviewed and updated as needed this visit by Provider         Review of Systems   ROS COMP: CONSTITUTIONAL:POSITIVE  for chills and NEGATIVE  for fever   ENT/MOUTH: POSITIVE for nasal congestion and postnasal drainage and NEGATIVE for fever and sinus pressure  RESP:POSITIVE for cough-productive, SOB/dyspnea and wheezing and NEGATIVE for hemoptysis and Hx asthma  CV: NEGATIVE for chest pain/chest pressure  GI: NEGATIVE for nausea, vomiting and weight loss  MUSCULOSKELETAL: NEGATIVE for significant arthralgias or myalgia  ENDOCRINE: NEGATIVE for temperature intolerance, skin/hair changes  HEME/ALLERGY/IMMUNE: NEGATIVE for allergies      Objective    /70 (BP Location: Right arm, Patient Position: Sitting, Cuff Size: Adult Regular)   Pulse 79   Temp 98.3  F (36.8  C) (Oral)   Wt 98.5 kg (217 lb 3.2 oz)   LMP  (LMP Unknown)   SpO2 96%   Breastfeeding? No   BMI 34.53 kg/m    Body mass index is 34.53 kg/m .  Physical Exam   GENERAL: Patient is well nourished, well developed,in no apparent distress, non-toxic, in no respiratory distress and acyanotic, alert, cooperative and well hydrated  ill and uncomfortable  EYES:  Right conjunctiva is not injected and without discharge.  Left conjunctiva is not injected and without discharge.  EARS: negative findings: external ears normal to inspection and palpation, no mastoid process tenderness, positive findings: , Right TM: serous middle ear fluid, Left TM: serous middle ear fluid  NOSE: positive findings: mucosa swollen, pale, and boggy,  Sinus not tender.  THROAT: normal.  NECK: supple with no adenopathy,   CARDIAC:NORMAL - regular rate and rhythm without murmur.  RESP: normal respiratory  rate and rhythm,  and mild to moderate expiratory wheezes  unlabored respirations, no intercostal retractions or accessory muscle use  ABD: Abdomen soft, non-tender.  SKIN: Skin color, texture, turgor normal. No rashes or lesions.  MS: extremities normal- no gross deformities noted, gait normal and normal muscle tone        Diagnostic Test Results:  Labs reviewed in Epic        Assessment & Plan     Tamika was seen today for cough.    Diagnoses and all orders for this visit:    Bronchitis with bronchospasm  -     predniSONE (DELTASONE) 20 MG tablet; Take 2 tablets (40 mg) by mouth daily for 5 days  -     doxycycline hyclate (VIBRA-TABS) 100 MG tablet; Take 1 tablet (100 mg) by mouth 2 times daily for 10 days  -     albuterol (PROAIR RESPICLICK) 108 (90 Base) MCG/ACT inhaler; Inhale 2 puffs into the lungs every 6 hours as needed for shortness of breath / dyspnea or wheezing    See Patient Instructions  Patient Instructions     PLAN:   1.   Symptomatic therapy suggested: rest, increase fluids, OTC Robitussin DM and call prn if symptoms persist or worsen.  2.  Orders Placed This Encounter   Medications     predniSONE (DELTASONE) 20 MG tablet     Sig: Take 2 tablets (40 mg) by mouth daily for 5 days     Dispense:  10 tablet     Refill:  0     doxycycline hyclate (VIBRA-TABS) 100 MG tablet     Sig: Take 1 tablet (100 mg) by mouth 2 times daily for 10 days     Dispense:  14 tablet     Refill:  0     albuterol (PROAIR RESPICLICK) 108 (90 Base) MCG/ACT inhaler     Sig: Inhale 2 puffs into the lungs every 6 hours as needed for shortness of breath / dyspnea or wheezing     Dispense:  1 Inhaler     Refill:  1     3. Patient needs to follow up in if no improvement,or sooner if worsening of symptoms or other symptoms develop.     Tobacco Cessation:   reports that she has been smoking cigarettes. She has a 11.50 pack-year smoking history. She has never used smokeless tobacco.  Tobacco Cessation Action Plan: Information offered:  Patient not interested at this time      No follow-ups on file.    LIV Heath CNP  [facility]

## 2019-09-24 NOTE — PATIENT INSTRUCTIONS
PLAN:   1.   Symptomatic therapy suggested: rest, increase fluids, OTC Robitussin DM and call prn if symptoms persist or worsen.  2.  Orders Placed This Encounter   Medications     predniSONE (DELTASONE) 20 MG tablet     Sig: Take 2 tablets (40 mg) by mouth daily for 5 days     Dispense:  10 tablet     Refill:  0     doxycycline hyclate (VIBRA-TABS) 100 MG tablet     Sig: Take 1 tablet (100 mg) by mouth 2 times daily for 10 days     Dispense:  14 tablet     Refill:  0     albuterol (PROAIR RESPICLICK) 108 (90 Base) MCG/ACT inhaler     Sig: Inhale 2 puffs into the lungs every 6 hours as needed for shortness of breath / dyspnea or wheezing     Dispense:  1 Inhaler     Refill:  1     3. Patient needs to follow up in if no improvement,or sooner if worsening of symptoms or other symptoms develop.

## 2019-09-25 LAB
FINAL DIAGNOSIS: ABNORMAL
HPV HR 12 DNA CVX QL NAA+PROBE: POSITIVE
HPV16 DNA SPEC QL NAA+PROBE: NEGATIVE
HPV18 DNA SPEC QL NAA+PROBE: NEGATIVE
SPECIMEN DESCRIPTION: ABNORMAL
SPECIMEN SOURCE CVX/VAG CYTO: ABNORMAL

## 2019-09-25 NOTE — PROGRESS NOTES
5/2/11 pap- ASCUS neg HPV. Plan-- per protocol, repeat screening pap in one year  2/5/14 pap NIL/neg HPV. Plan for routine screening  2015 NIL pap, Neg HPV.   9/19/19 NIL pap, + HR HPV (not 16 or 18). Plan cotest in 1 year. ALEC WaldropN, RN  9/26/19 [facility] [phone] ([facility] [place], [place])  10/3/19 Pt notified by phone.

## 2019-09-27 ENCOUNTER — ANCILLARY PROCEDURE (OUTPATIENT)
Dept: MAMMOGRAPHY | Facility: CLINIC | Age: 44
End: 2019-09-27
Attending: NURSE PRACTITIONER
Payer: COMMERCIAL

## 2019-09-27 DIAGNOSIS — Z12.39 ENCOUNTER FOR SCREENING BREAST EXAMINATION: ICD-10-CM

## 2019-09-27 PROCEDURE — 77063 BREAST TOMOSYNTHESIS BI: CPT | Performed by: RADIOLOGY

## 2019-09-27 PROCEDURE — 77067 SCR MAMMO BI INCL CAD: CPT | Performed by: RADIOLOGY

## 2019-11-07 ENCOUNTER — HEALTH MAINTENANCE LETTER (OUTPATIENT)
Age: 44
End: 2019-11-07

## 2020-09-03 ENCOUNTER — PATIENT OUTREACH (OUTPATIENT)
Dept: PEDIATRICS | Facility: CLINIC | Age: 45
End: 2020-09-03

## 2020-09-03 DIAGNOSIS — R87.810 CERVICAL HIGH RISK HPV (HUMAN PAPILLOMAVIRUS) TEST POSITIVE: ICD-10-CM

## 2020-09-03 NOTE — TELEPHONE ENCOUNTER
Reminder Letter/My Chart sent     Lindsay Estrada -   [facility] Pap Tracking

## 2020-09-03 NOTE — LETTER
September 3, 2020      Tamika Cahpin  [address] [facility] [id number]    Dear ,      At [facility], your health and wellness is our primary concern. That is why we are following up on a positive high risk HPV test from 9/19/19. Your provider had recommended that you have a Pap smear and HPV test completed by 9/19/20. Our records do not show that this has been scheduled.    It is important to complete the follow up that your provider has suggested for you to ensure that there are no worsening changes which may, over time, develop into cancer.      Please contact our office at  [phone] to schedule an appointment for a Pap smear and HPV test at your earliest convenience. If you have questions or concerns, please call the clinic and we will be happy to assist you.    If you have completed the tests outside of [facility], please have the results forwarded to our office. We will update the chart for your primary Physician to review before your next annual physical.     Thank you for choosing [facility]!    Sincerely,      Your [facility]/ezio

## 2020-10-01 ENCOUNTER — PATIENT OUTREACH (OUTPATIENT)
Dept: PEDIATRICS | Facility: CLINIC | Age: 45
End: 2020-10-01

## 2020-10-01 DIAGNOSIS — R87.810 CERVICAL HIGH RISK HPV (HUMAN PAPILLOMAVIRUS) TEST POSITIVE: ICD-10-CM

## 2020-11-05 ENCOUNTER — OFFICE VISIT (OUTPATIENT)
Dept: PEDIATRICS | Facility: CLINIC | Age: 45
End: 2020-11-05
Payer: COMMERCIAL

## 2020-11-05 VITALS
SYSTOLIC BLOOD PRESSURE: 113 MMHG | HEIGHT: 67 IN | BODY MASS INDEX: 34.59 KG/M2 | WEIGHT: 220.4 LBS | OXYGEN SATURATION: 98 % | TEMPERATURE: 98.3 F | DIASTOLIC BLOOD PRESSURE: 78 MMHG | HEART RATE: 75 BPM

## 2020-11-05 DIAGNOSIS — Z13.29 SCREENING FOR THYROID DISORDER: ICD-10-CM

## 2020-11-05 DIAGNOSIS — Z13.6 CARDIOVASCULAR SCREENING; LDL GOAL LESS THAN 160: ICD-10-CM

## 2020-11-05 DIAGNOSIS — Z13.21 ENCOUNTER FOR VITAMIN DEFICIENCY SCREENING: ICD-10-CM

## 2020-11-05 DIAGNOSIS — R79.89 ELEVATED LFTS: ICD-10-CM

## 2020-11-05 DIAGNOSIS — F43.23 ADJUSTMENT DISORDER WITH MIXED ANXIETY AND DEPRESSED MOOD: ICD-10-CM

## 2020-11-05 DIAGNOSIS — Z00.00 ROUTINE GENERAL MEDICAL EXAMINATION AT A HEALTH CARE FACILITY: Primary | ICD-10-CM

## 2020-11-05 DIAGNOSIS — Z12.4 SCREENING FOR MALIGNANT NEOPLASM OF CERVIX: ICD-10-CM

## 2020-11-05 DIAGNOSIS — Z13.1 SCREENING FOR DIABETES MELLITUS (DM): ICD-10-CM

## 2020-11-05 DIAGNOSIS — Z00.00 ROUTINE GENERAL MEDICAL EXAMINATION AT A HEALTH CARE FACILITY: ICD-10-CM

## 2020-11-05 LAB
ALBUMIN SERPL-MCNC: 4.1 G/DL (ref 3.4–5)
ALP SERPL-CCNC: 94 U/L (ref 40–150)
ALT SERPL W P-5'-P-CCNC: 41 U/L (ref 0–50)
ANION GAP SERPL CALCULATED.3IONS-SCNC: 7 MMOL/L (ref 3–14)
AST SERPL W P-5'-P-CCNC: 20 U/L (ref 0–45)
BILIRUB DIRECT SERPL-MCNC: 0.1 MG/DL (ref 0–0.2)
BILIRUB SERPL-MCNC: 0.7 MG/DL (ref 0.2–1.3)
BUN SERPL-MCNC: 8 MG/DL (ref 7–30)
CALCIUM SERPL-MCNC: 9 MG/DL (ref 8.5–10.1)
CHLORIDE SERPL-SCNC: 105 MMOL/L (ref 94–109)
CHOLEST SERPL-MCNC: 221 MG/DL
CO2 SERPL-SCNC: 25 MMOL/L (ref 20–32)
CREAT SERPL-MCNC: 0.87 MG/DL (ref 0.52–1.04)
GFR SERPL CREATININE-BSD FRML MDRD: 80 ML/MIN/{1.73_M2}
GLUCOSE SERPL-MCNC: 94 MG/DL (ref 70–99)
HDLC SERPL-MCNC: 44 MG/DL
LDLC SERPL CALC-MCNC: 143 MG/DL
NONHDLC SERPL-MCNC: 177 MG/DL
POTASSIUM SERPL-SCNC: 3.9 MMOL/L (ref 3.4–5.3)
PROT SERPL-MCNC: 7.8 G/DL (ref 6.8–8.8)
SODIUM SERPL-SCNC: 137 MMOL/L (ref 133–144)
TRIGL SERPL-MCNC: 170 MG/DL
TSH SERPL DL<=0.005 MIU/L-ACNC: 0.65 MU/L (ref 0.4–4)

## 2020-11-05 PROCEDURE — 82306 VITAMIN D 25 HYDROXY: CPT | Performed by: NURSE PRACTITIONER

## 2020-11-05 PROCEDURE — 36415 COLL VENOUS BLD VENIPUNCTURE: CPT | Performed by: NURSE PRACTITIONER

## 2020-11-05 PROCEDURE — 87624 HPV HI-RISK TYP POOLED RSLT: CPT | Performed by: NURSE PRACTITIONER

## 2020-11-05 PROCEDURE — 96127 BRIEF EMOTIONAL/BEHAV ASSMT: CPT | Performed by: NURSE PRACTITIONER

## 2020-11-05 PROCEDURE — G0145 SCR C/V CYTO,THINLAYER,RESCR: HCPCS | Performed by: NURSE PRACTITIONER

## 2020-11-05 PROCEDURE — 99396 PREV VISIT EST AGE 40-64: CPT | Performed by: NURSE PRACTITIONER

## 2020-11-05 PROCEDURE — 84443 ASSAY THYROID STIM HORMONE: CPT | Performed by: NURSE PRACTITIONER

## 2020-11-05 PROCEDURE — 80061 LIPID PANEL: CPT | Performed by: NURSE PRACTITIONER

## 2020-11-05 PROCEDURE — 80053 COMPREHEN METABOLIC PANEL: CPT | Performed by: NURSE PRACTITIONER

## 2020-11-05 PROCEDURE — 99213 OFFICE O/P EST LOW 20 MIN: CPT | Mod: 25 | Performed by: NURSE PRACTITIONER

## 2020-11-05 PROCEDURE — 82248 BILIRUBIN DIRECT: CPT | Performed by: NURSE PRACTITIONER

## 2020-11-05 ASSESSMENT — ANXIETY QUESTIONNAIRES
3. WORRYING TOO MUCH ABOUT DIFFERENT THINGS: NEARLY EVERY DAY
2. NOT BEING ABLE TO STOP OR CONTROL WORRYING: NEARLY EVERY DAY
1. FEELING NERVOUS, ANXIOUS, OR ON EDGE: SEVERAL DAYS
7. FEELING AFRAID AS IF SOMETHING AWFUL MIGHT HAPPEN: SEVERAL DAYS
5. BEING SO RESTLESS THAT IT IS HARD TO SIT STILL: NOT AT ALL
GAD7 TOTAL SCORE: 11
6. BECOMING EASILY ANNOYED OR IRRITABLE: NOT AT ALL

## 2020-11-05 ASSESSMENT — MIFFLIN-ST. JEOR: SCORE: 1682.36

## 2020-11-05 ASSESSMENT — PATIENT HEALTH QUESTIONNAIRE - PHQ9
5. POOR APPETITE OR OVEREATING: NEARLY EVERY DAY
SUM OF ALL RESPONSES TO PHQ QUESTIONS 1-9: 12

## 2020-11-05 NOTE — PROGRESS NOTES
SUBJECTIVE:   CC: Tamika Chapin is an 45 year old woman who presents for preventive health visit.       Patient has been advised of split billing requirements and indicates understanding: Yes  Healthy Habits:    Do you get at least three servings of calcium containing foods daily (dairy, green leafy vegetables, etc.)? yes    Amount of exercise or daily activities, outside of work: 0    Problems taking medications regularly Yes, patient stopped taking Bupropion 2 days ago due to recent panic attack    Medication side effects:  Panic attack    Have you had an eye exam in the past two years? yes    Do you see a dentist twice per year? yes    Do you have sleep apnea, excessive snoring or daytime drowsiness?yes snoring      Today's PHQ-2 Score:   PHQ-2 ( 1999 Pfizer) 11/5/2020 9/24/2019   Q1: Little interest or pleasure in doing things 1 0   Q2: Feeling down, depressed or hopeless 3 0   PHQ-2 Score 4 0       Abuse: Current or Past(Physical, Sexual or Emotional)- No  Do you feel safe in your environment? YES        Social History     Tobacco Use     Smoking status: Current Every Day Smoker     Packs/day: 0.50     Years: 23.00     Pack years: 11.50     Types: Cigarettes     Smokeless tobacco: Never Used     Tobacco comment: 1 cig occas   Substance Use Topics     Alcohol use: Yes     Comment: Occ. few times a month - couple of drinks     If you drink alcohol do you typically have >3 drinks per day or >7 drinks per week? No                     Reviewed orders with patient.  Reviewed health maintenance and updated orders accordingly - Yes  Lab work is in process  Labs reviewed in EPIC  BP Readings from Last 3 Encounters:   11/05/20 113/78   09/24/19 115/70   09/19/19 100/60    Wt Readings from Last 3 Encounters:   11/05/20 100 kg (220 lb 6.4 oz)   09/24/19 98.5 kg (217 lb 3.2 oz)   09/19/19 99.1 kg (218 lb 6.4 oz)                  Patient Active Problem List   Diagnosis     Tobacco use disorder     Hyperlipidemia LDL  goal <160     Cervical high risk HPV (human papillomavirus) test positive     Obesity     Past Surgical History:   Procedure Laterality Date     C APPENDECTOMY  07/1995       Social History     Tobacco Use     Smoking status: Current Every Day Smoker     Packs/day: 0.50     Years: 23.00     Pack years: 11.50     Types: Cigarettes     Smokeless tobacco: Never Used     Tobacco comment: 1 cig occas   Substance Use Topics     Alcohol use: Yes     Comment: Occ. few times a month - couple of drinks     Family History   Problem Relation Age of Onset     Diabetes Father      Hypertension Father      Respiratory Maternal Grandmother         emphysema     Diabetes Maternal Grandmother      Cancer Paternal Grandmother         lung     Other Cancer Paternal Grandmother      Depression Sister          Current Outpatient Medications   Medication Sig Dispense Refill     FLUoxetine (PROZAC) 20 MG capsule Take 1 capsule (20 mg) by mouth daily 90 capsule 1     No Known Allergies    Mammogram Screening: Patient under age 50, mutual decision reflected in health maintenance.      Pertinent mammograms are reviewed under the imaging tab.  History of abnormal Pap smear: YES - other categories - see link Cervical Cytology Screening Guidelines  PAP / HPV Latest Ref Rng & Units 11/5/2020 9/19/2019 4/30/2015   PAP - NIL NIL NIL   HPV 16 DNA NEG:Negative Negative Negative Negative   HPV 18 DNA NEG:Negative Negative Negative Negative   OTHER HR HPV NEG:Negative Negative Positive(A) Negative     Reviewed and updated as needed this visit by clinical staff  Tobacco  Allergies  Meds   Med Hx  Surg Hx  Fam Hx  Soc Hx        Reviewed and updated as needed this visit by Provider                Past Medical History:   Diagnosis Date     Cervical high risk HPV (human papillomavirus) test positive 09/19/2019    See problem list      Past Surgical History:   Procedure Laterality Date     C APPENDECTOMY  07/1995       ROS:  CONSTITUTIONAL:NEGATIVE for  "fever, chills, change in weight  INTEGUMENTARY/SKIN: NEGATIVE for worrisome rashes, moles or lesions  EYES: NEGATIVE for vision changes or irritation  ENT: NEGATIVE for ear, mouth and throat problems  RESP:NEGATIVE for significant cough or SOB  BREAST: NEGATIVE for masses, tenderness or discharge  CV: NEGATIVE for chest pain, palpitations or peripheral edema  GI: NEGATIVE for nausea, abdominal pain, heartburn, or change in bowel habits   female: normal menses, no unusual urinary symptoms and no unusual vaginal symptoms  MUSCULOSKELETAL:NEGATIVE for significant arthralgias or myalgia  NEURO: NEGATIVE for weakness, dizziness or paresthesias  ENDOCRINE: NEGATIVE for temperature intolerance, skin/hair changes  HEME/ALLERGY/IMMUNE: NEGATIVE for bleeding problems  PSYCHIATRIC: POSITIVE for grief due to loss of cat and has been grieving  and NEGATIVE forthoughts of hurting someone else and thoughts of self harm   PHQ-9 SCORE 11/5/2020   PHQ-9 Total Score 12       MARIBETH-7 SCORE 11/5/2020   Total Score 11       [facility] Follow-up to PHQ 11/5/2020   PHQ-9 9. Suicide Ideation past 2 weeks Not at all         OBJECTIVE:   /78 (BP Location: Right arm, Patient Position: Sitting, Cuff Size: Adult Large)   Pulse 75   Temp 98.3  F (36.8  C) (Temporal)   Ht 1.702 m (5' 7\")   Wt 100 kg (220 lb 6.4 oz)   LMP 10/16/2020 (Approximate)   SpO2 98%   BMI 34.52 kg/m     Wt Readings from Last 4 Encounters:   11/05/20 100 kg (220 lb 6.4 oz)   09/24/19 98.5 kg (217 lb 3.2 oz)   09/19/19 99.1 kg (218 lb 6.4 oz)   02/03/17 94.2 kg (207 lb 9.6 oz)       EXAM:  GENERAL: alert, no distress and obese  EYES: Eyes grossly normal to inspection and conjunctivae and sclerae normal  HENT: ear canals and TM's normal, nose and mouth without ulcers or lesions  NECK: no adenopathy, no asymmetry, masses, or scars and thyroid normal to palpation  RESP: lungs clear to auscultation - no rales, rhonchi or wheezes  BREAST: normal without masses, tenderness or " nipple discharge and no palpable axillary masses or adenopathy  CV: regular rates and rhythm, no murmur, click or rub, peripheral pulses strong and no peripheral edema  ABDOMEN: soft, nontender, no hepatosplenomegaly, no masses and bowel sounds normal   (female): normal female external genitalia, normal urethral meatus, vaginal mucosa pink, moist, well rugated, and normal cervix/adnexa/uterus without masses or discharge  MS: no gross musculoskeletal defects noted, no edema  SKIN: no suspicious lesions or rashes  NEURO: Normal strength and tone, mentation intact and speech normal  PSYCH: mentation appears normal, affect normal/bright  LYMPH: no cervical, supraclavicular, axillary, or inguinal adenopathy    Diagnostic Test Results:  Labs reviewed in Epic  Results for orders placed or performed in visit on 11/05/20   Pap imaged thin layer screen with HPV - recommended age 30 - 65 years (select HPV order below)     Status: None   Result Value Ref Range    PAP NIL     Copath Report         Patient Name: RAMESH STONE  MR#: [id number]  Specimen #: [id number]  Collected: 11/5/2020  Received: 11/6/2020  Reported: 11/10/2020 09:09  Ordering Phy(s): HAWA NAGEL    For improved result formatting, select 'View Enhanced Report Format' under   Linked Documents section.    SPECIMEN/STAIN PROCESS:  Pap imaged thin layer prep screening (Surepath, FocalPoint with guided   screening)       Pap-Cyto x 1, HPV ordered x 1    SOURCE: Cervical, endocervical  ----------------------------------------------------------------   Pap imaged thin layer prep screening (Surepath, FocalPoint with guided   screening)  SPECIMEN ADEQUACY:  Satisfactory for evaluation.  -Transformation zone component present.    CYTOLOGIC INTERPRETATION:    Negative for intraepithelial lesion or malignancy    Electronically signed out by:  VIOLET Glass (ASCP)    CLINICAL HISTORY:  LMP: 10/16/20  A previous normal pap  Date of Last Pap:  9/19/19,    Papanicolaou Test Limitations:  Cervical cytology is a  screening test with   limited sensitivity; regular  screening is critical for cancer prevention; Pap tests are primarily   effective for the diagnosis/prevention of  squamous cell carcinoma, not adenocarcinomas or other cancers.    The technical component of this testing was completed at the [facility], with the professional component performed   at the [facility], [address] [place] [phone] ([phone])    COLLECTION SITE:  Client:  [facility]  [place] [facility] [place]       HPV High Risk Types DNA Cervical     Status: None   Result Value Ref Range    HPV Source SurePath     HPV 16 DNA Negative NEG^Negative    HPV 18 DNA Negative NEG^Negative    Other HR HPV Negative NEG^Negative    Final Diagnosis This patient's sample is negative for HPV DNA.     Specimen Description Cervical Cells    Results for orders placed or performed in visit on 11/05/20   Vitamin D Deficiency     Status: None   Result Value Ref Range    Vitamin D Deficiency screening 25 20 - 75 ug/L   TSH with free T4 reflex     Status: None   Result Value Ref Range    TSH 0.65 0.40 - 4.00 mU/L   Comprehensive metabolic panel     Status: None   Result Value Ref Range    Sodium 137 133 - 144 mmol/L    Potassium 3.9 3.4 - 5.3 mmol/L    Chloride 105 94 - 109 mmol/L    Carbon Dioxide 25 20 - 32 mmol/L    Anion Gap 7 3 - 14 mmol/L    Glucose 94 70 - 99 mg/dL    Urea Nitrogen 8 7 - 30 mg/dL    Creatinine 0.87 0.52 - 1.04 mg/dL    GFR Estimate 80 >60 mL/min/[1.73_m2]    GFR Estimate If Black >90 >60 mL/min/[1.73_m2]    Calcium 9.0 8.5 - 10.1 mg/dL    Bilirubin Total 0.7 0.2 - 1.3 mg/dL    Albumin 4.1 3.4 - 5.0 g/dL    Protein Total 7.8 6.8 - 8.8 g/dL    Alkaline Phosphatase 94 40 - 150 U/L    ALT 41 0 - 50 U/L    AST 20 0 - 45  U/L   Lipid panel reflex to direct LDL Fasting     Status: Abnormal   Result Value Ref Range    Cholesterol 221 (H) <200 mg/dL    Triglycerides 170 (H) <150 mg/dL    HDL Cholesterol 44 (L) >49 mg/dL    LDL Cholesterol Calculated 143 (H) <100 mg/dL    Non HDL Cholesterol 177 (H) <130 mg/dL   Bilirubin direct     Status: None   Result Value Ref Range    Bilirubin Direct 0.1 0.0 - 0.2 mg/dL       ASSESSMENT/PLAN:   Tamika was seen today for physical.    Diagnoses and all orders for this visit:    Routine general medical examination at a health care facility  -     JUST IN CASE; Future    Adjustment disorder with mixed anxiety and depressed mood  -     FLUoxetine (PROZAC) 20 MG capsule; Take 1 capsule (20 mg) by mouth daily    CARDIOVASCULAR SCREENING; LDL GOAL LESS THAN 160  -     Lipid panel reflex to direct LDL Fasting; Future    Screening for diabetes mellitus (DM)  -     Comprehensive metabolic panel; Future    Screening for thyroid disorder  -     TSH with free T4 reflex; Future    Encounter for vitamin deficiency screening  -     Vitamin D Deficiency; Future    Screening for malignant neoplasm of cervix  -     Pap imaged thin layer screen with HPV - recommended age 30 - 65 years (select HPV order below)  -     HPV High Risk Types DNA Cervical    PLAN:   1.   Symptomatic therapy suggested: will start on Prozac 20 mg once a day.  2.  Orders Placed This Encounter   Medications     FLUoxetine (PROZAC) 20 MG capsule     Sig: Take 1 capsule (20 mg) by mouth daily     Dispense:  90 capsule     Refill:  1     Orders Placed This Encounter   Procedures     JUST IN CASE     Lipid panel reflex to direct LDL Fasting     Comprehensive metabolic panel     TSH with free T4 reflex     Vitamin D Deficiency     Pap imaged thin layer screen with HPV - recommended age 30 - 65 years (select HPV order below)     HPV High Risk Types DNA Cervical       3. Patient needs to follow up in if no improvement,or sooner if worsening of symptoms  "or other symptoms develop.  FURTHER TESTING:       - mammogram  Work on weight loss  Regular exercise  Follow up in 1 month on my chart on how you are doing   Follow up office visit in one year for annual health maintenance exam, sooner PRN.        Patient has been advised of split billing requirements and indicates understanding: Yes  COUNSELING:   Reviewed preventive health counseling, as reflected in patient instructions  Special attention given to:        Regular exercise       Healthy diet/nutrition       Vision screening       Contraception       Family planning       Osteoporosis prevention/bone health       The 10-year ASCVD risk score (Zachary STEVENSON Jr., et al., 2013) is: 3.6%    Values used to calculate the score:      Age: 45 years      Sex: Female      Is Non- : No      Diabetic: No      Tobacco smoker: Yes      Systolic Blood Pressure: 113 mmHg      Is BP treated: No      HDL Cholesterol: 44 mg/dL      Total Cholesterol: 221 mg/dL    Estimated body mass index is 34.52 kg/m  as calculated from the following:    Height as of this encounter: 1.702 m (5' 7\").    Weight as of this encounter: 100 kg (220 lb 6.4 oz).    Weight management plan: Discussed healthy diet and exercise guidelines    She reports that she has been smoking cigarettes. She has a 11.50 pack-year smoking history. She has never used smokeless tobacco.  Tobacco Cessation Action Plan:   Information offered: Patient not interested at this time      Counseling Resources:  ATP IV Guidelines  Pooled Cohorts Equation Calculator  Breast Cancer Risk Calculator  BRCA-Related Cancer Risk Assessment: FHS-7 Tool  FRAX Risk Assessment  ICSI Preventive Guidelines  Dietary Guidelines for Americans, 2010  USDA's MyPlate  ASA Prophylaxis  Lung CA Screening    [facility]"

## 2020-11-05 NOTE — PATIENT INSTRUCTIONS
PLAN:   1.   Symptomatic therapy suggested: will start on Prozac 20 mg once a day.  2.  Orders Placed This Encounter   Medications     FLUoxetine (PROZAC) 20 MG capsule     Sig: Take 1 capsule (20 mg) by mouth daily     Dispense:  90 capsule     Refill:  1     Orders Placed This Encounter   Procedures     JUST IN CASE     Lipid panel reflex to direct LDL Fasting     Comprehensive metabolic panel     TSH with free T4 reflex     Vitamin D Deficiency     Pap imaged thin layer screen with HPV - recommended age 30 - 65 years (select HPV order below)     HPV High Risk Types DNA Cervical       3. Patient needs to follow up in if no improvement,or sooner if worsening of symptoms or other symptoms develop.  FURTHER TESTING:       - mammogram  Work on weight loss  Regular exercise  Follow up in 1 month on my chart on how you are doing   Follow up office visit in one year for annual health maintenance exam, sooner PRN.    Preventive Health Recommendations  Female Ages 40 to 49    Yearly exam:     See your health care provider every year in order to  1. Review health changes.   2. Discuss preventive care.    3. Review your medicines if your doctor prescribed any.      Get a Pap test every three years (unless you have an abnormal result and your provider advises testing more often).      If you get Pap tests with HPV test, you only need to test every 5 years, unless you have an abnormal result. You do not need a Pap test if your uterus was removed (hysterectomy) and you have not had cancer.      You should be tested each year for STDs (sexually transmitted diseases), if you're at risk.     Ask your doctor if you should have a mammogram.      Have a colonoscopy (test for colon cancer) if someone in your family has had colon cancer or polyps before age 50.       Have a cholesterol test every 5 years.       Have a diabetes test (fasting glucose) after age 45. If you are at risk for diabetes, you should have this test every 3  years.    Shots: Get a flu shot each year. Get a tetanus shot every 10 years.     Nutrition:     Eat at least 5 servings of fruits and vegetables each day.    Eat whole-grain bread, whole-wheat pasta and brown rice instead of white grains and rice.    Get adequate Calcium and Vitamin D.      Lifestyle    Exercise at least 150 minutes a week (an average of 30 minutes a day, 5 days a week). This will help you control your weight and prevent disease.    Limit alcohol to one drink per day.    No smoking.     Wear sunscreen to prevent skin cancer.    See your dentist every six months for an exam and cleaning.

## 2020-11-06 LAB — DEPRECATED CALCIDIOL+CALCIFEROL SERPL-MC: 25 UG/L (ref 20–75)

## 2020-11-06 ASSESSMENT — ANXIETY QUESTIONNAIRES: GAD7 TOTAL SCORE: 11

## 2020-11-06 NOTE — RESULT ENCOUNTER NOTE
Kael Chapin,    Attached are your test results.  -Vitamin D level is mildly below normal and getting 1000 IU daily in  supplements is recommended.    Please contact us if you have any questions.    Dona Hannah, CNP

## 2020-11-06 NOTE — RESULT ENCOUNTER NOTE
Kael Chapin,    Attached are your test results.  -LDL(bad) cholesterol level is elevated, HDL(good) cholesterol level is low and your triglycerides are elevated which can increase your heart disease risk.  A diet high in fat and simple carbohydrates, genetics and being overweight can contribute to this. ADVISE: exercising 150 minutes of aerobic exercise per week (30 minutes for 5 days per week or 50 minutes for 3 days per week are options), eating a low saturated fat/low carbohydrate diet, and omega-3 fatty acids (fish oil)  daily are helpful to improve this. In 12 months, you should recheck your fasting cholesterol panel by scheduling a lab-only appointment.  -Liver and gallbladder tests are normal (ALT,AST, Alk phos, bilirubin), kidney function is normal (Cr, GFR), sodium is normal, potassium is normal, calcium is normal, glucose is normal.  -TSH (thyroid stimulating hormone) level is normal which indicates normal thyroid function.   Please contact us if you have any questions.    Dona Hannah, CNP

## 2020-11-10 LAB
COPATH REPORT: NORMAL
PAP: NORMAL

## 2020-11-11 LAB
FINAL DIAGNOSIS: NORMAL
HPV HR 12 DNA CVX QL NAA+PROBE: NEGATIVE
HPV16 DNA SPEC QL NAA+PROBE: NEGATIVE
HPV18 DNA SPEC QL NAA+PROBE: NEGATIVE
SPECIMEN DESCRIPTION: NORMAL
SPECIMEN SOURCE CVX/VAG CYTO: NORMAL

## 2020-11-12 ENCOUNTER — PATIENT OUTREACH (OUTPATIENT)
Dept: PEDIATRICS | Facility: CLINIC | Age: 45
End: 2020-11-12

## 2020-11-12 NOTE — TELEPHONE ENCOUNTER
5/2/11 pap- ASCUS neg HPV. Plan-- per protocol, repeat screening pap in one year  2/5/14 pap NIL/neg HPV. Plan for routine screening  2015 NIL pap, Neg HPV.   9/19/19 NIL pap, + HR HPV (not 16 or 18). Plan cotest in 1 year.   9/3/20 Reminder letter/My Chart sent--Not Read  10/0/20 [facility] clinic and schedule.  11//05/20 NIL Pap, Neg HPV. Plan cotest in one year due bef 11/5/21.   11/12/20 Pt sent results through [facility].

## 2020-11-17 DIAGNOSIS — Z12.31 VISIT FOR SCREENING MAMMOGRAM: ICD-10-CM

## 2020-11-29 ENCOUNTER — HEALTH MAINTENANCE LETTER (OUTPATIENT)
Age: 45
End: 2020-11-29

## 2020-12-03 ENCOUNTER — MYC MEDICAL ADVICE (OUTPATIENT)
Dept: PEDIATRICS | Facility: CLINIC | Age: 45
End: 2020-12-03

## 2020-12-03 DIAGNOSIS — F43.23 ADJUSTMENT DISORDER WITH MIXED ANXIETY AND DEPRESSED MOOD: ICD-10-CM

## 2020-12-03 NOTE — TELEPHONE ENCOUNTER
Forwarding update to provider to review/advise.  Initial Rx written for #90 w/1 refill, inquiring with patient if only 30 day supply was dispensed.    Rosie Elizabeth RN

## 2020-12-04 NOTE — TELEPHONE ENCOUNTER
PHQ-9 SCORE 11/5/2020   PHQ-9 Total Score 12       MARIBETH-7 SCORE 11/5/2020   Total Score 11       [facility] Follow-up to PHQ 11/5/2020   PHQ-9 9. Suicide Ideation past 2 weeks Not at all     Please update PHQ 9 and MARIBETH  Thanks

## 2021-04-02 ENCOUNTER — IMMUNIZATION (OUTPATIENT)
Dept: NURSING | Facility: CLINIC | Age: 46
End: 2021-04-02
Payer: COMMERCIAL

## 2021-04-02 PROCEDURE — 91300 PR COVID VAC PFIZER DIL RECON 30 MCG/0.3 ML IM: CPT

## 2021-04-02 PROCEDURE — 0001A PR COVID VAC PFIZER DIL RECON 30 MCG/0.3 ML IM: CPT

## 2021-04-23 ENCOUNTER — IMMUNIZATION (OUTPATIENT)
Dept: NURSING | Facility: CLINIC | Age: 46
End: 2021-04-23
Attending: INTERNAL MEDICINE
Payer: COMMERCIAL

## 2021-04-23 PROCEDURE — 91300 PR COVID VAC PFIZER DIL RECON 30 MCG/0.3 ML IM: CPT

## 2021-04-23 PROCEDURE — 0002A PR COVID VAC PFIZER DIL RECON 30 MCG/0.3 ML IM: CPT

## 2021-07-29 DIAGNOSIS — F43.23 ADJUSTMENT DISORDER WITH MIXED ANXIETY AND DEPRESSED MOOD: ICD-10-CM

## 2021-07-29 NOTE — TELEPHONE ENCOUNTER
Routing refill request to provider for review/approval because:  Patient is due for 6 month follow-up.   Patient needs updated PHQ-9.   PHQ-9 and GAD7 Scores  11/5/2020    PHQ-9 Total Score 12   MARIBETH-7 Total Score 11    11/5/2020       Lindsay Park RN  [facility]

## 2021-08-26 NOTE — TELEPHONE ENCOUNTER
Called pt and LVM asking her to call back. I am going to close the encounter as I have reached out to this pt 3 times now to no avail.

## 2021-09-25 ENCOUNTER — HEALTH MAINTENANCE LETTER (OUTPATIENT)
Age: 46
End: 2021-09-25

## 2021-10-25 ENCOUNTER — PATIENT OUTREACH (OUTPATIENT)
Dept: FAMILY MEDICINE | Facility: CLINIC | Age: 46
End: 2021-10-25
Payer: COMMERCIAL

## 2021-10-25 DIAGNOSIS — R87.810 CERVICAL HIGH RISK HPV (HUMAN PAPILLOMAVIRUS) TEST POSITIVE: ICD-10-CM

## 2021-12-11 DIAGNOSIS — F43.23 ADJUSTMENT DISORDER WITH MIXED ANXIETY AND DEPRESSED MOOD: ICD-10-CM

## 2021-12-13 NOTE — TELEPHONE ENCOUNTER
Routing refill request to provider for review/approval because:  Patient needs to be seen because it has been more than 1 year since last office visit.      Nickie Matos RN  [facility]

## 2022-01-15 ENCOUNTER — HEALTH MAINTENANCE LETTER (OUTPATIENT)
Age: 47
End: 2022-01-15

## 2022-02-07 ASSESSMENT — ENCOUNTER SYMPTOMS
ABDOMINAL PAIN: 0
ARTHRALGIAS: 1
SORE THROAT: 0
FEVER: 0
DIARRHEA: 1
PARESTHESIAS: 0
HEARTBURN: 0
DYSURIA: 0
SHORTNESS OF BREATH: 0
HEMATURIA: 0
NAUSEA: 0
MYALGIAS: 0
HEMATOCHEZIA: 0
JOINT SWELLING: 0
DIZZINESS: 0
COUGH: 0
HEADACHES: 0
CONSTIPATION: 0
FREQUENCY: 1
NERVOUS/ANXIOUS: 0
PALPITATIONS: 0
EYE PAIN: 0
WEAKNESS: 0
BREAST MASS: 0
CHILLS: 0

## 2022-02-08 ENCOUNTER — OFFICE VISIT (OUTPATIENT)
Dept: FAMILY MEDICINE | Facility: CLINIC | Age: 47
End: 2022-02-08
Payer: COMMERCIAL

## 2022-02-08 VITALS
TEMPERATURE: 98.6 F | SYSTOLIC BLOOD PRESSURE: 117 MMHG | WEIGHT: 240.8 LBS | HEART RATE: 77 BPM | RESPIRATION RATE: 16 BRPM | BODY MASS INDEX: 38.7 KG/M2 | OXYGEN SATURATION: 99 % | DIASTOLIC BLOOD PRESSURE: 80 MMHG | HEIGHT: 66 IN

## 2022-02-08 DIAGNOSIS — Z12.11 SCREEN FOR COLON CANCER: ICD-10-CM

## 2022-02-08 DIAGNOSIS — E66.01 CLASS 2 SEVERE OBESITY DUE TO EXCESS CALORIES WITH SERIOUS COMORBIDITY AND BODY MASS INDEX (BMI) OF 38.0 TO 38.9 IN ADULT (H): ICD-10-CM

## 2022-02-08 DIAGNOSIS — Z13.6 CARDIOVASCULAR SCREENING; LDL GOAL LESS THAN 160: ICD-10-CM

## 2022-02-08 DIAGNOSIS — E66.812 CLASS 2 SEVERE OBESITY DUE TO EXCESS CALORIES WITH SERIOUS COMORBIDITY AND BODY MASS INDEX (BMI) OF 38.0 TO 38.9 IN ADULT (H): ICD-10-CM

## 2022-02-08 DIAGNOSIS — Z23 NEED FOR TDAP VACCINATION: ICD-10-CM

## 2022-02-08 DIAGNOSIS — Z12.4 SCREENING FOR MALIGNANT NEOPLASM OF CERVIX: ICD-10-CM

## 2022-02-08 DIAGNOSIS — Z12.31 ENCOUNTER FOR SCREENING MAMMOGRAM FOR BREAST CANCER: ICD-10-CM

## 2022-02-08 DIAGNOSIS — Z13.1 SCREENING FOR DIABETES MELLITUS (DM): ICD-10-CM

## 2022-02-08 DIAGNOSIS — F17.200 NICOTINE DEPENDENCE, UNCOMPLICATED, UNSPECIFIED NICOTINE PRODUCT TYPE: ICD-10-CM

## 2022-02-08 DIAGNOSIS — Z00.00 ROUTINE GENERAL MEDICAL EXAMINATION AT A HEALTH CARE FACILITY: Primary | ICD-10-CM

## 2022-02-08 DIAGNOSIS — F43.23 ADJUSTMENT DISORDER WITH MIXED ANXIETY AND DEPRESSED MOOD: ICD-10-CM

## 2022-02-08 DIAGNOSIS — Z13.29 SCREENING FOR THYROID DISORDER: ICD-10-CM

## 2022-02-08 LAB
ALBUMIN SERPL-MCNC: 4.2 G/DL (ref 3.4–5)
ALP SERPL-CCNC: 111 U/L (ref 40–150)
ALT SERPL W P-5'-P-CCNC: 42 U/L (ref 0–50)
ANION GAP SERPL CALCULATED.3IONS-SCNC: 7 MMOL/L (ref 3–14)
AST SERPL W P-5'-P-CCNC: 24 U/L (ref 0–45)
BILIRUB SERPL-MCNC: 0.7 MG/DL (ref 0.2–1.3)
BUN SERPL-MCNC: 17 MG/DL (ref 7–30)
CALCIUM SERPL-MCNC: 8.8 MG/DL (ref 8.5–10.1)
CHLORIDE BLD-SCNC: 104 MMOL/L (ref 94–109)
CHOLEST SERPL-MCNC: 241 MG/DL
CO2 SERPL-SCNC: 25 MMOL/L (ref 20–32)
CREAT SERPL-MCNC: 0.75 MG/DL (ref 0.52–1.04)
FASTING STATUS PATIENT QL REPORTED: NO
GFR SERPL CREATININE-BSD FRML MDRD: >90 ML/MIN/1.73M2
GLUCOSE BLD-MCNC: 91 MG/DL (ref 70–99)
HDLC SERPL-MCNC: 41 MG/DL
LDLC SERPL CALC-MCNC: 151 MG/DL
NONHDLC SERPL-MCNC: 200 MG/DL
POTASSIUM BLD-SCNC: 4.1 MMOL/L (ref 3.4–5.3)
PROT SERPL-MCNC: 8.2 G/DL (ref 6.8–8.8)
SODIUM SERPL-SCNC: 136 MMOL/L (ref 133–144)
TRIGL SERPL-MCNC: 245 MG/DL
TSH SERPL DL<=0.005 MIU/L-ACNC: 0.8 MU/L (ref 0.4–4)

## 2022-02-08 PROCEDURE — 36415 COLL VENOUS BLD VENIPUNCTURE: CPT | Performed by: NURSE PRACTITIONER

## 2022-02-08 PROCEDURE — 90471 IMMUNIZATION ADMIN: CPT | Performed by: NURSE PRACTITIONER

## 2022-02-08 PROCEDURE — 99396 PREV VISIT EST AGE 40-64: CPT | Mod: 25 | Performed by: NURSE PRACTITIONER

## 2022-02-08 PROCEDURE — 80061 LIPID PANEL: CPT | Performed by: NURSE PRACTITIONER

## 2022-02-08 PROCEDURE — 99213 OFFICE O/P EST LOW 20 MIN: CPT | Mod: 25 | Performed by: NURSE PRACTITIONER

## 2022-02-08 PROCEDURE — 87624 HPV HI-RISK TYP POOLED RSLT: CPT | Performed by: NURSE PRACTITIONER

## 2022-02-08 PROCEDURE — 90715 TDAP VACCINE 7 YRS/> IM: CPT | Performed by: NURSE PRACTITIONER

## 2022-02-08 PROCEDURE — G0145 SCR C/V CYTO,THINLAYER,RESCR: HCPCS | Performed by: NURSE PRACTITIONER

## 2022-02-08 PROCEDURE — 80053 COMPREHEN METABOLIC PANEL: CPT | Performed by: NURSE PRACTITIONER

## 2022-02-08 PROCEDURE — 84443 ASSAY THYROID STIM HORMONE: CPT | Performed by: NURSE PRACTITIONER

## 2022-02-08 RX ORDER — NICOTINE 21 MG/24HR
1 PATCH, TRANSDERMAL 24 HOURS TRANSDERMAL EVERY 24 HOURS
Qty: 28 PATCH | Refills: 1 | Status: SHIPPED | OUTPATIENT
Start: 2022-02-08 | End: 2022-04-18

## 2022-02-08 ASSESSMENT — MIFFLIN-ST. JEOR: SCORE: 1754.01

## 2022-02-08 NOTE — PATIENT INSTRUCTIONS
PLAN:   1.   Symptomatic therapy suggested: Continue current medications as prescribed.   Increase calcium to 1000mg and 1000iu Vit D  Will start on nicoderm for smoking   2.  Orders Placed This Encounter   Medications     nicotine (NICODERM CQ) 14 MG/24HR 24 hr patch     Sig: Place 1 patch onto the skin every 24 hours     Dispense:  28 patch     Refill:  1     FLUoxetine (PROZAC) 20 MG capsule     Sig: Take 2 capsules (40 mg) by mouth daily     Dispense:  180 capsule     Refill:  3     Orders Placed This Encounter   Procedures     REVIEW OF HEALTH MAINTENANCE PROTOCOL ORDERS     MA SCREENING DIGITAL BILAT - Future  (s+30)     TDAP VACCINE (Adacel, Boostrix)  [[id number]]     Lipid panel reflex to direct LDL Fasting     Comprehensive metabolic panel     TSH with free T4 reflex     COLOGUARD(EXACT SCIENCES)       3. Patient needs to follow up in if no improvement,or sooner if worsening of symptoms or other symptoms develop.  FURTHER TESTING:       - mammogram  Work on weight loss  Regular exercise  Will follow up and/or notify patient of  results via My Chart to determine further need for followup  Follow up office visit in one year for annual health maintenance exam, sooner PRN.    Preventive Health Recommendations  Female Ages 40 to 49    Yearly exam:     See your health care provider every year in order to  1. Review health changes.   2. Discuss preventive care.    3. Review your medicines if your doctor prescribed any.      Get a Pap test every three years (unless you have an abnormal result and your provider advises testing more often).      If you get Pap tests with HPV test, you only need to test every 5 years, unless you have an abnormal result. You do not need a Pap test if your uterus was removed (hysterectomy) and you have not had cancer.      You should be tested each year for STDs (sexually transmitted diseases), if you're at risk.     Ask your doctor if you should have a mammogram.      Have a colonoscopy  (test for colon cancer) if someone in your family has had colon cancer or polyps before age 50.       Have a cholesterol test every 5 years.       Have a diabetes test (fasting glucose) after age 45. If you are at risk for diabetes, you should have this test every 3 years.    Shots: Get a flu shot each year. Get a tetanus shot every 10 years.     Nutrition:     Eat at least 5 servings of fruits and vegetables each day.    Eat whole-grain bread, whole-wheat pasta and brown rice instead of white grains and rice.    Get adequate Calcium and Vitamin D.      Lifestyle    Exercise at least 150 minutes a week (an average of 30 minutes a day, 5 days a week). This will help you control your weight and prevent disease.    Limit alcohol to one drink per day.    No smoking.     Wear sunscreen to prevent skin cancer.    See your dentist every six months for an exam and cleaning.

## 2022-02-08 NOTE — PROGRESS NOTES
SUBJECTIVE:   CC: Tamika Chapin is an 46 year old woman who presents for preventive health visit.     Healthy Habits:     Getting at least 3 servings of Calcium per day:  Yes    Bi-annual eye exam:  Yes    Dental care twice a year:  Yes    Sleep apnea or symptoms of sleep apnea:  Excessive snoring    Diet:  Regular (no restrictions)    Frequency of exercise:  None    Taking medications regularly:  Yes    Medication side effects:  None    PHQ-2 Total Score: 0    Additional concerns today:  No      Today's PHQ-2 Score:   PHQ-2 ( 1999 Pfizer) 2/7/2022   Q1: Little interest or pleasure in doing things 0   Q2: Feeling down, depressed or hopeless 0   PHQ-2 Score 0   PHQ-2 Total Score (12-17 Years)- Positive if 3 or more points; Administer PHQ-A if positive -   Q1: Little interest or pleasure in doing things Not at all   Q2: Feeling down, depressed or hopeless Not at all   PHQ-2 Score 0       Abuse: Current or Past (Physical, Sexual or Emotional) - No  Do you feel safe in your environment? Yes    Have you ever done Advance Care Planning? (For example, a Health Directive, POLST, or a discussion with a medical provider or your loved ones about your wishes): No, advance care planning information given to patient to review.  Patient declined advance care planning discussion at this time.    Social History     Tobacco Use     Smoking status: Current Every Day Smoker     Packs/day: 0.50     Years: 30.00     Pack years: 15.00     Types: Cigarettes     Start date: 6/1/1992     Smokeless tobacco: Never Used     Tobacco comment: Quite Date Set for 2/14/222   Substance Use Topics     Alcohol use: Yes     Comment: Occ. few times a month - couple of drinks     If you drink alcohol do you typically have >3 drinks per day or >7 drinks per week? No    Alcohol Use 2/7/2022   Prescreen: >3 drinks/day or >7 drinks/week? No   Prescreen: >3 drinks/day or >7 drinks/week? -       Reviewed orders with patient.  Reviewed health maintenance  and updated orders accordingly - Yes  Lab work is in process  Labs reviewed in EPIC  BP Readings from Last 3 Encounters:   02/08/22 117/80   11/05/20 113/78   09/24/19 115/70    Wt Readings from Last 3 Encounters:   02/08/22 109.2 kg (240 lb 12.8 oz)   11/05/20 100 kg (220 lb 6.4 oz)   09/24/19 98.5 kg (217 lb 3.2 oz)                  Patient Active Problem List   Diagnosis     Tobacco use disorder     Hyperlipidemia LDL goal <160     Cervical high risk HPV (human papillomavirus) test positive     Obesity     Past Surgical History:   Procedure Laterality Date     [facility]C APPENDECTOMY  07/1995       Social History     Tobacco Use     Smoking status: Current Every Day Smoker     Packs/day: 0.50     Years: 30.00     Pack years: 15.00     Types: Cigarettes     Start date: 6/1/1992     Smokeless tobacco: Never Used     Tobacco comment: Quite Date Set for 2/14/222   Substance Use Topics     Alcohol use: Yes     Comment: Occ. few times a month - couple of drinks     Family History   Problem Relation Age of Onset     Diabetes Father      Hypertension Father      Respiratory Maternal Grandmother         emphysema     Diabetes Maternal Grandmother      Cancer Paternal Grandmother         lung     Other Cancer Paternal Grandmother      Depression Sister          Current Outpatient Medications   Medication Sig Dispense Refill     FLUoxetine (PROZAC) 20 MG capsule TAKE 2 CAPSULES BY MOUTH EVERY DAY 60 capsule 0     No Known Allergies    Breast Cancer Screening:    Breast CA Risk Assessment (FHS-7) 2/7/2022   Do you have a family history of breast, colon, or ovarian cancer? No / Unknown     Mammogram Screening: Recommended annual mammography  Pertinent mammograms are reviewed under the imaging tab.    History of abnormal Pap smear: YES - other categories - see link Cervical Cytology Screening Guidelines  PAP / HPV Latest Ref Rng & Units 2/8/2022 11/5/2020 9/19/2019   PAP   Negative for Intraepithelial Lesion or Malignancy (NILM) - -  "  PAP (Historical) - - NIL NIL   HPV16 Negative Negative Negative Negative   HPV18 Negative Negative Negative Negative   HRHPV Negative Negative Negative Positive(A)     Reviewed and updated as needed this visit by clinical staff                Reviewed and updated as needed this visit by Provider               Past Medical History:   Diagnosis Date     Cervical high risk HPV (human papillomavirus) test positive 09/19/2019    See problem list      Past Surgical History:   Procedure Laterality Date     [facility] APPENDECTOMY  07/1995       Review of Systems  CONSTITUTIONAL:POSITIVE  for weight gain and NEGATIVE  for fatigue and sweats  INTEGUMENTARY/SKIN: NEGATIVE for worrisome rashes, moles or lesions  EYES: NEGATIVE for vision changes or irritation  ENT: NEGATIVE for ear, mouth and throat problems  RESP:NEGATIVE for significant cough or SOB  BREAST: NEGATIVE for masses, tenderness or discharge  CV: NEGATIVE for chest pain, palpitations or peripheral edema  GI: POSITIVE for Hx GERD and hx of hiatal hernia  NEGATIVE for jaundice, melena, nausea, poor appetite, vomiting and weight loss   female: normal menses, no unusual urinary symptoms and no unusual vaginal symptoms  MUSCULOSKELETAL:POSITIVE  for joint pain her knees and aggravated by stairs. Will try to lose weight and see if that helps  and NEGATIVE for joint swelling  and joint warmth   NEURO: NEGATIVE for weakness, dizziness or paresthesias  ENDOCRINE: NEGATIVE for temperature intolerance, skin/hair changes  HEME/ALLERGY/IMMUNE: NEGATIVE for bleeding problems  PSYCHIATRIC: POSITIVE forHx anxiety, Hx depression and doing well on present medications  and NEGATIVE forthoughts of hurting someone else and thoughts of self harm         OBJECTIVE:   /80 (BP Location: Right arm, Patient Position: Sitting, Cuff Size: Adult Regular)   Pulse 77   Temp 98.6  F (37  C) (Oral)   Resp 16   Ht 1.676 m (5' 6\")   Wt 109.2 kg (240 lb 12.8 oz)   LMP 02/08/2022   SpO2 99%  "  Breastfeeding No   BMI 38.87 kg/m    Physical Exam  GENERAL: healthy, alert and no distress  EYES: Eyes grossly normal to inspection and conjunctivae and sclerae normal  HENT: ear canals and TM's normal, nose and mouth without ulcers or lesions  NECK: no adenopathy, no asymmetry, masses, or scars and thyroid normal to palpation  RESP: lungs clear to auscultation - no rales, rhonchi or wheezes  BREAST: normal without masses, tenderness or nipple discharge and no palpable axillary masses or adenopathy  CV: regular rates and rhythm, no murmur, click or rub, peripheral pulses strong and no peripheral edema  ABDOMEN: soft, nontender, no hepatosplenomegaly, no masses and bowel sounds normal   (female): normal female external genitalia, normal urethral meatus, vaginal mucosa pink, moist, well rugated, and normal cervix/adnexa/uterus without masses or discharge  MS: no gross musculoskeletal defects noted, no edema  SKIN: no suspicious lesions or rashes  NEURO: Normal strength and tone, mentation intact and speech normal  PSYCH: mentation appears normal, affect normal/bright  LYMPH: no cervical, supraclavicular, axillary, or inguinal adenopathy    Diagnostic Test Results:  Labs reviewed in Epic  Results for orders placed or performed in visit on 02/08/22   Lipid panel reflex to direct LDL Fasting     Status: Abnormal   Result Value Ref Range    Cholesterol 241 (H) <200 mg/dL    Triglycerides 245 (H) <150 mg/dL    Direct Measure HDL 41 (L) >=50 mg/dL    LDL Cholesterol Calculated 151 (H) <=100 mg/dL    Non HDL Cholesterol 200 (H) <130 mg/dL    Patient Fasting > 8hrs? No     Narrative    Cholesterol  Desirable:  <200 mg/dL    Triglycerides  Normal:  Less than 150 mg/dL  Borderline High:  150-199 mg/dL  High:  200-499 mg/dL  Very High:  Greater than or equal to 500 mg/dL    Direct Measure HDL  Female:  Greater than or equal to 50 mg/dL   Male:  Greater than or equal to 40 mg/dL    LDL Cholesterol  Desirable:   <100mg/dL  Above Desirable:  100-129 mg/dL   Borderline High:  130-159 mg/dL   High:  160-189 mg/dL   Very High:  >= 190 mg/dL    Non HDL Cholesterol  Desirable:  130 mg/dL  Above Desirable:  130-159 mg/dL  Borderline High:  160-189 mg/dL  High:  190-219 mg/dL  Very High:  Greater than or equal to 220 mg/dL   Comprehensive metabolic panel     Status: Normal   Result Value Ref Range    Sodium 136 133 - 144 mmol/L    Potassium 4.1 3.4 - 5.3 mmol/L    Chloride 104 94 - 109 mmol/L    Carbon Dioxide (CO2) 25 20 - 32 mmol/L    Anion Gap 7 3 - 14 mmol/L    Urea Nitrogen 17 7 - 30 mg/dL    Creatinine 0.75 0.52 - 1.04 mg/dL    Calcium 8.8 8.5 - 10.1 mg/dL    Glucose 91 70 - 99 mg/dL    Alkaline Phosphatase 111 40 - 150 U/L    AST 24 0 - 45 U/L    ALT 42 0 - 50 U/L    Protein Total 8.2 6.8 - 8.8 g/dL    Albumin 4.2 3.4 - 5.0 g/dL    Bilirubin Total 0.7 0.2 - 1.3 mg/dL    GFR Estimate >90 >60 mL/min/1.73m2   TSH with free T4 reflex     Status: Normal   Result Value Ref Range    TSH 0.80 0.40 - 4.00 mU/L   HPV High Risk Types DNA Cervical     Status: None   Result Value Ref Range    Other HR HPV Negative Negative    HPV16 DNA Negative Negative    HPV18 DNA Negative Negative    FINAL DIAGNOSIS       This patient's sample is negative for HPV DNA.        This test was developed and its performance characteristics determined by the [facility]. It has not been cleared or approved by the FDA. The laboratory is regulated under CLIA as qualified to perform high-complexity testing. This test is used for clinical purposes. It should not be regarded as investigational or for research.    METHODOLOGY: The Roche Jose 4800 system uses automated extraction, simultaneous amplification of HPV (L1 region) and beta-globin, followed by real time detection of fluorescent labeled HPV and beta globin using specific oligonucleotide probes. The test specifically identified types HPV 16 DNA  and HPV 18 DNA while concurrently detecting the rest of the high risk types (31, 33, 35, 39, 45, 51, 52, 56, 58, 59, 66 or 68).    COMMENTS: This test is not intended for use as a screening device for woman under age 30 with normal cervical cytology. Results should be correlated with cytologic and histologic findings. Close clinical followup is recommended.       Pap Screen with HPV - recommended age 30 - 65 years     Status: None   Result Value Ref Range    Interpretation        Negative for Intraepithelial Lesion or Malignancy (NILM)    Comment         Papanicolaou Test Limitations:  Cervical cytology is a screening test with limited sensitivity, and regular screening is critical for cancer prevention.  Pap tests are primarily effective for the diagnosis/prevention of squamous cell carcinoma, not adenocarcinoma or other cancers.        Specimen Adequacy       Satisfactory for evaluation, endocervical/transformation zone component absent    Clinical Information       none      Reflex Testing Yes regardless of result     Previous Abnormal?       No      Performing Labs       The technical component of this testing was completed at [facility]         ASSESSMENT/PLAN:   Tamika was seen today for physical.    Diagnoses and all orders for this visit:    Routine general medical examination at a health care facility  -     REVIEW OF HEALTH MAINTENANCE PROTOCOL ORDERS    CARDIOVASCULAR SCREENING; LDL GOAL LESS THAN 160  -     Lipid panel reflex to direct LDL Fasting; Future  -      Screening for diabetes mellitus (DM)  -     Comprehensive metabolic panel; Future  -     Comprehensive metabolic panel    Screening for thyroid disorder  -     TSH with free T4 reflex; Future  -     TSH with free T4 reflex    Screening for malignant neoplasm of cervix  -     Pap Screen with HPV - recommended age 30 - 65 years  -     HPV Hold (Lab Only)  -     HPV High Risk Types DNA  Cervical    Screen for colon cancer  -     COLOGUARD(EXACT SCIENCES)    Encounter for screening mammogram for breast cancer  -     MA SCREENING DIGITAL BILAT - Future  (s+30); Future    Nicotine dependence, uncomplicated, unspecified nicotine product type  Will Start:'-     nicotine (NICODERM CQ) 14 MG/24HR 24 hr patch; Place 1 patch onto the skin every 24 hours    Adjustment disorder with mixed anxiety and depressed mood  Continue current medications as prescribed.   -     FLUoxetine (PROZAC) 20 MG capsule; Take 2 capsules (40 mg) by mouth daily    Need for Tdap vaccination  -     TDAP VACCINE (Adacel, Boostrix)  [[id number]]    Class 2 severe obesity due to excess calories with serious comorbidity and body mass index (BMI) of 38.0 to 38.9 in adult (H)  Healthy diet and exercise reviewed.  Limit pop and juice intake.  Risks of obesity discussed.  Encourage exercise.  Limit TV/Computer/game time to one hour a day.    PLAN:   1.   Symptomatic therapy suggested: Continue current medications as prescribed.   Increase calcium to 1000mg and 1000iu Vit D  Will start on nicoderm for smoking   2.  Orders Placed This Encounter   Medications     nicotine (NICODERM CQ) 14 MG/24HR 24 hr patch     Sig: Place 1 patch onto the skin every 24 hours     Dispense:  28 patch     Refill:  1     FLUoxetine (PROZAC) 20 MG capsule     Sig: Take 2 capsules (40 mg) by mouth daily     Dispense:  180 capsule     Refill:  3     Orders Placed This Encounter   Procedures     REVIEW OF HEALTH MAINTENANCE PROTOCOL ORDERS     MA SCREENING DIGITAL BILAT - Future  (s+30)     TDAP VACCINE (Adacel, Boostrix)  [[id number]]     Lipid panel reflex to direct LDL Fasting     Comprehensive metabolic panel     TSH with free T4 reflex     COLOGUARD(EXACT SCIENCES)       3. Patient needs to follow up in if no improvement,or sooner if worsening of symptoms or other symptoms develop.  FURTHER TESTING:       - mammogram  Work on weight loss  Regular exercise  Will follow  "up and/or notify patient of  results via My Chart to determine further need for followup  Follow up office visit in one year for annual health maintenance exam, sooner PRN.    Patient has been advised of split billing requirements and indicates understanding: Yes    COUNSELING:  Reviewed preventive health counseling, as reflected in patient instructions  Special attention given to:        Regular exercise       Healthy diet/nutrition       Vision screening       Osteoporosis prevention/bone health       Colorectal Cancer Screening       Consider Hep C screening for all patients one time for ages 18-79 years       The 10-year ASCVD risk score (Zachary STEVENSON Jr., et al., 2013) is: 5.1%    Values used to calculate the score:      Age: 47 years      Sex: Female      Is Non- : No      Diabetic: No      Tobacco smoker: Yes      Systolic Blood Pressure: 117 mmHg      Is BP treated: No      HDL Cholesterol: 41 mg/dL      Total Cholesterol: 241 mg/dL       Advance Care Planning    Estimated body mass index is 38.87 kg/m  as calculated from the following:    Height as of this encounter: 1.676 m (5' 6\").    Weight as of this encounter: 109.2 kg (240 lb 12.8 oz).    Weight management plan: Discussed healthy diet and exercise guidelines    She reports that she has been smoking cigarettes. She started smoking about 29 years ago. She has a 15.00 pack-year smoking history. She has never used smokeless tobacco.  Tobacco Cessation Action Plan:   Pharmacotherapies : Nicotine patch      Counseling Resources:  ATP IV Guidelines  Pooled Cohorts Equation Calculator  Breast Cancer Risk Calculator  BRCA-Related Cancer Risk Assessment: FHS-7 Tool  FRAX Risk Assessment  ICSI Preventive Guidelines  Dietary Guidelines for Americans, 2010  USDA's MyPlate  ASA Prophylaxis  Lung CA Screening    [facility]  "

## 2022-02-09 NOTE — RESULT ENCOUNTER NOTE
Kael Chapin,    Attached are your test results.  -LDL(bad) cholesterol level is elevated, HDL(good) cholesterol level is low and your triglycerides are elevated which can increase your heart disease risk.  A diet high in fat and simple carbohydrates, genetics and being overweight can contribute to this. ADVISE: exercising 150 minutes of aerobic exercise per week (30 minutes for 5 days per week or 50 minutes for 3 days per week are options), eating a low saturated fat/low carbohydrate diet, and omega-3 fatty acids (fish oil)  daily are helpful to improve this. In 6 months, you should recheck your fasting cholesterol panel by scheduling a lab-only appointment.  -Liver and gallbladder tests are normal (ALT,AST, Alk phos, bilirubin), kidney function is normal (Cr, GFR), sodium is normal, potassium is normal, calcium is normal, glucose is normal.  -TSH (thyroid stimulating hormone) level is normal which indicates normal thyroid function.   Please contact us if you have any questions.    Dona Hannah, CNP

## 2022-02-10 LAB
BKR LAB AP GYN ADEQUACY: NORMAL
BKR LAB AP GYN INTERPRETATION: NORMAL
BKR LAB AP HPV REFLEX: NORMAL
BKR LAB AP PREVIOUS ABNORMAL: NORMAL
PATH REPORT.COMMENTS IMP SPEC: NORMAL
PATH REPORT.COMMENTS IMP SPEC: NORMAL
PATH REPORT.RELEVANT HX SPEC: NORMAL

## 2022-02-11 LAB
HUMAN PAPILLOMA VIRUS 16 DNA: NEGATIVE
HUMAN PAPILLOMA VIRUS 18 DNA: NEGATIVE
HUMAN PAPILLOMA VIRUS FINAL DIAGNOSIS: NORMAL
HUMAN PAPILLOMA VIRUS OTHER HR: NEGATIVE

## 2022-02-28 LAB — COLOGUARD-ABSTRACT: NEGATIVE

## 2022-03-07 NOTE — RESULT ENCOUNTER NOTE
Kael Chapin,    Attached are your test results.  Cologuard is negative    Please contact us if you have any questions.    Dona Hannah, CNP

## 2022-03-12 ENCOUNTER — HEALTH MAINTENANCE LETTER (OUTPATIENT)
Age: 47
End: 2022-03-12

## 2022-03-24 ENCOUNTER — ANCILLARY PROCEDURE (OUTPATIENT)
Dept: MAMMOGRAPHY | Facility: CLINIC | Age: 47
End: 2022-03-24
Attending: NURSE PRACTITIONER
Payer: COMMERCIAL

## 2022-03-24 DIAGNOSIS — Z12.31 ENCOUNTER FOR SCREENING MAMMOGRAM FOR BREAST CANCER: ICD-10-CM

## 2022-03-24 PROCEDURE — 77067 SCR MAMMO BI INCL CAD: CPT | Mod: TC | Performed by: RADIOLOGY

## 2022-04-15 DIAGNOSIS — F17.200 NICOTINE DEPENDENCE, UNCOMPLICATED, UNSPECIFIED NICOTINE PRODUCT TYPE: ICD-10-CM

## 2022-04-18 RX ORDER — NICOTINE 21 MG/24HR
1 PATCH, TRANSDERMAL 24 HOURS TRANSDERMAL EVERY 24 HOURS
Qty: 28 PATCH | Refills: 1 | Status: SHIPPED | OUTPATIENT
Start: 2022-04-18

## 2022-06-14 ENCOUNTER — E-VISIT (OUTPATIENT)
Dept: FAMILY MEDICINE | Facility: CLINIC | Age: 47
End: 2022-06-14
Payer: COMMERCIAL

## 2022-06-14 DIAGNOSIS — R59.0 POSTERIOR AURICULAR LYMPHADENOPATHY: Primary | ICD-10-CM

## 2022-06-14 PROCEDURE — 99421 OL DIG E/M SVC 5-10 MIN: CPT | Performed by: NURSE PRACTITIONER

## 2022-06-16 NOTE — PATIENT INSTRUCTIONS
Dear Tamika Chapin    After reviewing your responses, I've been able to diagnose you with?a sinus infection caused by bacteria.?     Based on your responses and diagnosis, I have prescribed Augmentin  to treat your symptoms. I have sent this to your pharmacy.?     It is also important to stay well hydrated, get lots of rest and take over-the-counter decongestants,?tylenol?or ibuprofen if you?are able to?take those medications per your primary care provider to help relieve discomfort.?     It is important that you take?all of?your prescribed medication even if your symptoms are improving after a few doses.? Taking?all of?your medicine helps prevent the symptoms from returning.?     If your symptoms worsen, you develop severe headache, vomiting, high fever (>102), or are not improving in 7 days, please contact your primary care provider for an appointment or visit any of our convenient Walk-in Care or Urgent Care Centers to be seen which can be found on our website?here.?     Thanks again for choosing?us?as your health care partner,?   ?  Dona Hannah, LIV CNP?

## 2022-06-24 ENCOUNTER — OFFICE VISIT (OUTPATIENT)
Dept: FAMILY MEDICINE | Facility: CLINIC | Age: 47
End: 2022-06-24
Payer: COMMERCIAL

## 2022-06-24 ENCOUNTER — HOSPITAL ENCOUNTER (EMERGENCY)
Facility: CLINIC | Age: 47
Discharge: HOME | End: 2022-06-24
Attending: EMERGENCY MEDICINE | Admitting: EMERGENCY MEDICINE
Payer: COMMERCIAL

## 2022-06-24 ENCOUNTER — ANCILLARY PROCEDURE (OUTPATIENT)
Dept: CT IMAGING | Facility: CLINIC | Age: 47
End: 2022-06-24
Attending: NURSE PRACTITIONER
Payer: COMMERCIAL

## 2022-06-24 ENCOUNTER — TELEPHONE (OUTPATIENT)
Dept: FAMILY MEDICINE | Facility: CLINIC | Age: 47
End: 2022-06-24

## 2022-06-24 VITALS
TEMPERATURE: 98.3 F | BODY MASS INDEX: 38.47 KG/M2 | OXYGEN SATURATION: 99 % | WEIGHT: 245.1 LBS | DIASTOLIC BLOOD PRESSURE: 81 MMHG | RESPIRATION RATE: 18 BRPM | HEIGHT: 67 IN | SYSTOLIC BLOOD PRESSURE: 121 MMHG | HEART RATE: 101 BPM

## 2022-06-24 VITALS
TEMPERATURE: 98 F | SYSTOLIC BLOOD PRESSURE: 120 MMHG | HEART RATE: 75 BPM | WEIGHT: 245 LBS | OXYGEN SATURATION: 99 % | DIASTOLIC BLOOD PRESSURE: 70 MMHG | HEIGHT: 67 IN | BODY MASS INDEX: 38.45 KG/M2 | RESPIRATION RATE: 18 BRPM

## 2022-06-24 DIAGNOSIS — F17.200 TOBACCO USE DISORDER: ICD-10-CM

## 2022-06-24 DIAGNOSIS — H92.01 PAIN OF RIGHT MASTOID: ICD-10-CM

## 2022-06-24 DIAGNOSIS — R59.0 POSTERIOR AURICULAR LYMPHADENOPATHY: ICD-10-CM

## 2022-06-24 DIAGNOSIS — R51.9 ACUTE NONINTRACTABLE HEADACHE, UNSPECIFIED HEADACHE TYPE: ICD-10-CM

## 2022-06-24 DIAGNOSIS — R51.9 ACUTE NONINTRACTABLE HEADACHE, UNSPECIFIED HEADACHE TYPE: Primary | ICD-10-CM

## 2022-06-24 DIAGNOSIS — G93.89 CEREBRAL VENTRICULOMEGALY: Primary | ICD-10-CM

## 2022-06-24 LAB — RADIOLOGIST FLAGS: ABNORMAL

## 2022-06-24 PROCEDURE — 99282 EMERGENCY DEPT VISIT SF MDM: CPT | Performed by: EMERGENCY MEDICINE

## 2022-06-24 PROCEDURE — 99281 EMR DPT VST MAYX REQ PHY/QHP: CPT

## 2022-06-24 PROCEDURE — 99214 OFFICE O/P EST MOD 30 MIN: CPT | Performed by: NURSE PRACTITIONER

## 2022-06-24 PROCEDURE — 99207 PR NO BILLABLE SERVICE THIS VISIT: CPT | Performed by: PSYCHIATRY & NEUROLOGY

## 2022-06-24 PROCEDURE — 70450 CT HEAD/BRAIN W/O DYE: CPT | Performed by: RADIOLOGY

## 2022-06-24 ASSESSMENT — PAIN SCALES - GENERAL: PAINLEVEL: NO PAIN (0)

## 2022-06-24 NOTE — TELEPHONE ENCOUNTER
Radiologist reporting Urgent finding,     Impression:  Diffuse ventriculomegaly without definitive transependymal flow of  CSF. Cannot exclude an early or compensated communicating  hydrocephalus. If there are prior studies available for comparison  they could be of value.     [Access Center: Diffuse ventriculomegaly, early versus compensated  communicating hydrocephalus not excluded]     Message routed high priority to care team and provider.  Teams message to follow up.     Please advise  Josephine Castaneda RN

## 2022-06-24 NOTE — PATIENT INSTRUCTIONS
PLAN:   1.   Symptomatic therapy suggested: will monitor symptoms and if increased pain will let me know and will start on antibiotics again and consider referral to ENT.    2.  Orders Placed This Encounter   Procedures    CT Head w/o Contrast     3. Patient needs to follow up in if no improvement,or sooner if worsening of symptoms or other symptoms develop.  FURTHER TESTING:       - CT head   I will place order. Please call [phone] to schedule.  Will follow up and/or notify patient of  results via My Chart to determine further need for followup

## 2022-06-24 NOTE — ED TRIAGE NOTES
"Pt was told to come to ER due to having an abnormal head CT.     Patient denies of having any headache at this time. Denies CP and SOB.    BP (!) 154/92   Pulse 109   Temp 98  F (36.7  C) (Oral)   Resp 20   Ht 1.702 m (5' 7\")   Wt 111.1 kg (245 lb)   SpO2 98%   BMI 38.37 kg/m         Triage Assessment     Row Name 06/24/22 1418       Triage Assessment (Adult)    Airway WDL WDL       Respiratory WDL    Respiratory WDL WDL       Skin Circulation/Temperature WDL    Skin Circulation/Temperature WDL WDL       Cardiac WDL    Cardiac WDL WDL       Peripheral/Neurovascular WDL    Peripheral Neurovascular WDL WDL       Cognitive/Neuro/Behavioral WDL    Cognitive/Neuro/Behavioral WDL WDL              "

## 2022-06-24 NOTE — PROGRESS NOTES
Subjective   Tamika is a 47 year old, presenting for the following health issues:  Sinus Problem and Mass      History of Present Illness       Reason for visit:  Hard lump behind ear, some pain when pressed. Mild occasional headaches.    She eats 2-3 servings of fruits and vegetables daily.She consumes 1 sweetened beverage(s) daily.She exercises with enough effort to increase her heart rate 9 or less minutes per day.  She exercises with enough effort to increase her heart rate 3 or less days per week. She is missing 1 dose(s) of medications per week.       Concern - Lump on ear right  Onset: 2 weeks ago   Description: not painful, getting better, hard lump   Noticed about 2 weeks ago   We did a online message and was treated with Augmentin and finished just yesterday   The sinus congestion was mild and that is actually better now   No issues with allergies usually   The lump behind the right ear was doing better then starting getting more painful and now in the last 48 hours seems to have improved   No fever or chills   No cough   Had some headache when she has been coughing or bending over even today   Sinus issues have not been severe, been mild.   Does smoke about a half pack a day  Cough with headache intermittent but has been going for at least a year   Will also occur when bending over    No visual disturbances     Headache  Onset of symptoms was 1year(s).  Course of illness is waxing and waning  Severity mild  Character of pain:dull   Current and associated symptoms: none  Location of pain: global  Prodromal sx?:  No  History of Migranes: No  Is headache similar to previous: No:   Predisposing factors: None  Treatment and measures tried: Tylenol  Seems to be aggravated by a cough or bending over     Labs reviewed in EPIC  BP Readings from Last 3 Encounters:   06/24/22 121/81   02/08/22 117/80   11/05/20 113/78    Wt Readings from Last 3 Encounters:   06/24/22 111.2 kg (245 lb 1.6 oz)   02/08/22 109.2 kg  "(240 lb 12.8 oz)   11/05/20 100 kg (220 lb 6.4 oz)                  Patient Active Problem List   Diagnosis     Tobacco use disorder     Hyperlipidemia LDL goal <160     Cervical high risk HPV (human papillomavirus) test positive     Obesity     Past Surgical History:   Procedure Laterality Date     ZZC APPENDECTOMY  07/1995       Social History     Tobacco Use     Smoking status: Current Every Day Smoker     Packs/day: 0.50     Years: 30.00     Pack years: 15.00     Types: Cigarettes     Start date: 6/1/1992     Smokeless tobacco: Never Used     Tobacco comment: Quite Date Set for 2/14/222   Substance Use Topics     Alcohol use: Yes     Comment: Occ. few times a month - couple of drinks     Family History   Problem Relation Age of Onset     Diabetes Father      Hypertension Father      Respiratory Maternal Grandmother         emphysema     Diabetes Maternal Grandmother      Cancer Paternal Grandmother         lung     Other Cancer Paternal Grandmother      Depression Sister          Current Outpatient Medications   Medication Sig Dispense Refill     amoxicillin-clavulanate (AUGMENTIN) 875-125 MG tablet Take 1 tablet by mouth 2 times daily 20 tablet 0     FLUoxetine (PROZAC) 20 MG capsule Take 2 capsules (40 mg) by mouth daily 180 capsule 3     nicotine (NICODERM CQ) 14 MG/24HR 24 hr patch PLACE 1 PATCH ONTO THE SKIN EVERY 24 HOURS. 28 patch 1     No Known Allergies    Review of Systems   Constitutional, HEENT, cardiovascular, pulmonary, gi and gu systems are negative, except as otherwise noted.      Objective    /81 (BP Location: Right arm, Patient Position: Sitting, Cuff Size: Adult Large)   Pulse 101   Temp 98.3  F (36.8  C) (Oral)   Resp 18   Ht 1.702 m (5' 7\")   Wt 111.2 kg (245 lb 1.6 oz)   SpO2 99%   BMI 38.39 kg/m    Body mass index is 38.39 kg/m .  Physical Exam   GENERAL: Patient is well nourished, well developed,in no apparent distress, non-toxic, in no respiratory distress and acyanotic, " alert, cooperative and well hydrated  alert, active, comfortable, in no acute distress  EYES:  Right conjunctiva is not injected and without discharge.  Left conjunctiva is not injected and without discharge.  EARS: negative findings: external ears normal to inspection and palpation, TM's clear, positive findings: very slight right mastoid tenderness, Right TM: normal landmarks and mobility, Left TM: normal landmarks and mobility  NOSE: negative,  Sinus not tender.  THROAT: normal.  NECK: supple with no adenopathy,   CARDIAC:NORMAL - regular rate and rhythm without murmur.  RESP: normal respiratory rate and rhythm, lungs clear to auscultation  unlabored respirations, no intercostal retractions or accessory muscle use  ABD: Abdomen soft, non-tender.  SKIN: Skin color, texture, turgor normal. No rashes or lesions.  MS: extremities normal- no gross deformities noted, gait normal and normal muscle tone    Diagnostic Test Results:   Diagnostic Test Results:  Labs reviewed in Epic  Pending orders and results       Assessment & Plan     Acute nonintractable headache, unspecified headache type  Will do CT to rule out mass or sinus element   - CT Head w/o Contrast    Posterior auricular lymphadenopathy  Has just completed antibiotic and symptoms seem to have resolved     Pain of right mastoid  Will monitor for the next week and if reoccurrence of pain will restart Augmentin due to concern was mastoiditis   - CT Head w/o Contrast    Tobacco use disorder    Discussed risks of smoking and strongly advised smoking cessation.       Ordering of each unique test  Prescription drug management   Time spent doing chart review, history and exam, documentation and further activities per the note       See Patient Instructions  Patient Instructions     PLAN:   1.   Symptomatic therapy suggested: will monitor symptoms and if increased pain will let me know and will start on antibiotics again and consider referral to ENT.    2.  Orders  Placed This Encounter   Procedures     CT Head w/o Contrast     3. Patient needs to follow up in if no improvement,or sooner if worsening of symptoms or other symptoms develop.  FURTHER TESTING:       - CT head   I will place order. Please call [phone] to schedule.  Will follow up and/or notify patient of  results via My Chart to determine further need for followup      Return in about 1 week (around 7/1/2022), or if symptoms worsen or fail to improve, for CT Scan.    [facility]              .  ..

## 2022-06-25 NOTE — ED PROVIDER NOTES
ED Provider Note  [facility] EMERGENCY DEPARTMENT ([facility])  June 24, 2022    History     Chief Complaint   Patient presents with     Referral     Abnormal CT of Head results      HPI  Tamika Chapin is a 47 year old female with a past medical history including hyperlipidemia who presents to the Emergency Department following a referral from her PCP.  Patient was told to come to the ED due to having an abnormal head CT.  Patient states that she has been having intermittent headaches mostly when coughing or sneezing or bending over.  Over the past couple weeks she had seen her primary care provider as she felt that this may be related to a sinus infection and noticed some pain behind her right ear/slight swelling.  She was placed on a course of Augmentin and states she completed this and was feeling improved.  She went back to her primary care provider as she had felt earlier this week that maybe the swelling behind the right ear had returned.  They had felt that this was likely postauricular lymph node but that it could potentially be mastitis per their documentation and thus they obtained a CT of the head.  The CT showed ventriculomegaly and the primary care provider sent the patient to the ER today for further evaluation.  Patient states she does not have any headache currently.  She states that she will sometimes get a dull headache throughout her head intermittently as well.  She states typically these are very mild and sometimes she does not even necessitate any Tylenol or ibuprofen for them.  She states that these intermittent headaches have been going on for some time.  She reports she did not think much of it.  She denies any inner ear pain.  She states that actually this pain behind her right ear has improved spontaneously.  She states she did go swimming in a pool a few days ago prior to this slightly worsening.  She denies any fevers, current sinus  congestion or rhinorrhea, vision changes, numbness, tingling, weakness, dizziness, feeling off balance, nausea, vomiting, diarrhea, cough, chest pain, shortness of breath, abdominal pain, or any other symptoms.  She states currently she feels at her baseline.  She denies any family history of neurologic problems.  She states she does have some baseline intermittent issues with stress incontinence but this is not new.  She states she has never had imaging of her head in the past.    CT did not reveal any sign of mastoiditis.    Past Medical History  Past Medical History:   Diagnosis Date     Cervical high risk HPV (human papillomavirus) test positive 09/19/2019    See problem list     Past Surgical History:   Procedure Laterality Date     ZC APPENDECTOMY  07/1995     amoxicillin-clavulanate (AUGMENTIN) 875-125 MG tablet  FLUoxetine (PROZAC) 20 MG capsule  nicotine (NICODERM CQ) 14 MG/24HR 24 hr patch      No Known Allergies  Family History  Family History   Problem Relation Age of Onset     Diabetes Father      Hypertension Father      Respiratory Maternal Grandmother         emphysema     Diabetes Maternal Grandmother      Cancer Paternal Grandmother         lung     Other Cancer Paternal Grandmother      Depression Sister      Social History   Social History     Tobacco Use     Smoking status: Current Every Day Smoker     Packs/day: 0.50     Years: 30.00     Pack years: 15.00     Types: Cigarettes     Start date: 6/1/1992     Smokeless tobacco: Never Used     Tobacco comment: Quite Date Set for 2/14/222   Substance Use Topics     Alcohol use: Yes     Comment: Occ. few times a month - couple of drinks     Drug use: No      Past medical history, past surgical history, medications, allergies, family history, and social history were reviewed with the patient. No additional pertinent items.       Review of Systems  A complete review of systems was performed with pertinent positives and negatives noted in the HPI, and  "all other systems negative.    Physical Exam   BP: (!) 154/92  Pulse: 109  Temp: 98  F (36.7  C)  Resp: 20  Height: 170.2 cm (5' 7\")  Weight: 111.1 kg (245 lb)  SpO2: 98 %  Physical Exam  Vitals reviewed.   Constitutional:       General: She is not in acute distress.     Appearance: She is well-developed.   HENT:      Head: Normocephalic and atraumatic.      Right Ear: Tympanic membrane, ear canal and external ear normal.      Left Ear: Tympanic membrane, ear canal and external ear normal.      Ears:      Comments: Possible very slight swelling noted behind the right ear likely a postauricular lymph node.  No surrounding erythema, induration, crepitus.  She does not have any tenderness along the mastoid bone.  She has no signs of otitis externa.  No rashes.  Hearing is normal on exam.  Tympanic membranes appear normal bilaterally.     Mouth/Throat:      Mouth: Mucous membranes are moist.   Eyes:      General: No visual field deficit.     Extraocular Movements: Extraocular movements intact.      Conjunctiva/sclera: Conjunctivae normal.      Pupils: Pupils are equal, round, and reactive to light.   Cardiovascular:      Rate and Rhythm: Normal rate and regular rhythm.      Pulses: Normal pulses.      Heart sounds: Normal heart sounds.   Pulmonary:      Effort: Pulmonary effort is normal. No respiratory distress.      Breath sounds: Normal breath sounds.   Abdominal:      General: Bowel sounds are normal. There is no distension.      Palpations: Abdomen is soft.      Tenderness: There is no abdominal tenderness.   Musculoskeletal:         General: Normal range of motion.      Cervical back: Normal range of motion and neck supple. No rigidity.   Skin:     General: Skin is warm and dry.      Capillary Refill: Capillary refill takes less than 2 seconds.      Findings: No rash.   Neurological:      General: No focal deficit present.      Mental Status: She is alert and oriented to person, place, and time.      GCS: GCS eye " subscore is 4. GCS verbal subscore is 5. GCS motor subscore is 6.      Cranial Nerves: Cranial nerves 2-12 are intact. No cranial nerve deficit, dysarthria or facial asymmetry.      Sensory: Sensation is intact. No sensory deficit.      Motor: Motor function is intact. No weakness, abnormal muscle tone or pronator drift.      Coordination: Coordination normal. Finger-Nose-Finger Test normal.      Gait: Gait normal.      Comments: 5 out of 5 strength in all 4 extremities bilaterally.  Sensation intact light touch in all 4 extremities and the face bilaterally.  Ambulating here without difficulty.   Psychiatric:         Mood and Affect: Mood normal.         ED Course      Procedures       The medical record was reviewed and interpreted.  Previous images reviewed and interpreted: as below.              Results for orders placed or performed in visit on 06/24/22   CT Head w/o Contrast     Status: Abnormal   Result Value Ref Range    Radiologist flags Diffuse ventriculomegaly, early versus compensated (Urgent)     Narrative    CT HEAD W/O CONTRAST 6/24/2022 11:23 AM    History: Headache; Chronic HA (>= 3 months); HA with clinical  progression; No known/automatically detected potential  contraindications to MRI; Pain of right mastoid; Acute nonintractable  headache, unspecified headache type   ICD-10: Pain of right mastoid; Acute nonintractable headache,  unspecified headache type    Comparison: None available    Technique: Using multidetector thin collimation helical acquisition  technique, axial, coronal and sagittal CT images from the skull base  to the vertex were obtained without intravenous contrast.   (topogram) image(s) also obtained and reviewed.    Findings: There is no intracranial hemorrhage, mass effect, or midline  shift. Gray/white matter differentiation in both cerebral hemispheres  is preserved. There is diffuse ventriculomegaly and out of proportion  to the sulci. There is no definite evidence for  transependymal flow of  CSF. 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 The basal cisterns are clear.    The bony calvaria and the bones of the skull base are normal. The  visualized portions of the paranasal sinuses and mastoid air cells are  clear.      Impression    Impression:  Diffuse ventriculomegaly without definitive transependymal flow of  CSF. Cannot exclude an early or compensated communicating  hydrocephalus. If there are prior studies available for comparison  they could be of value.    [Access Center: Diffuse ventriculomegaly, early versus compensated  communicating hydrocephalus not excluded]    This report will be copied to the [facility] to ensure a  provider acknowledges the finding. Access Center is available Monday  through Friday 8am-3:30 pm.       ELIECER MERCADO MD         SYSTEM ID:  [id number]     Medications - No data to display     Assessments & Plan (with Medical Decision Making)   Patient presents after having a CT performed as an outpatient related to evaluating for mastoiditis that found ventriculomegaly.  She was sent here by her primary care provider.  She is entirely asymptomatic at this time.  She has no focal neurologic deficits or cerebellar deficit.  She has no headache or complaints currently.  She states that the pain she was having behind her right ear has now resolved.  There is a possible very small postauricular lymph node noted here without any signs of erythema or significant tenderness.  No induration or fluctuance.  No signs of otitis externa or otitis media.  The CT scan was reviewed as above.  There was no signs of mastoiditis.  There was diffuse ventriculomegaly early versus compensated communicating hydrocephalus not excluded.  I discussed the patient with neurology who agreed that they should come down and see the patient and determine further work-up.  Neurology evaluated the patient and felt that she did not warrant any emergent further work-up here and could  follow-up as an outpatient for MRI and in their residency clinic as soon as possible.  They did not feel she warranted any further evaluation here and did not feel neurosurgery needed to see her here either.  Patient was in agreement with this plan.  She was provided with neurology referral and the neurology team ordered her MRI as well and we advised her to discuss with them to schedule.  She was given strict return precautions to the ER.  She voiced understanding and was comfortable with this plan.  She was discharged home in stable condition.    I have reviewed the nursing notes. I have reviewed the findings, diagnosis, plan and need for follow up with the patient.    New Prescriptions    No medications on file       Final diagnoses:   Cerebral ventriculomegaly       --  Rose Mary Rader MD  [facility] EMERGENCY DEPARTMENT  6/24/2022     Rose Mary Rader MD  06/25/22 [id number]

## 2022-06-25 NOTE — CONSULTS
[facility]  Neurology Consultation    Patient Name:  Tamika Chapin  MRN:  [id number]    :  1975  Date of Service:  2022  Primary care provider:  Dona Hannah      Neurology consultation service was asked to see Tamika Chapin to evaluate for ventriculomegaly.    Chief Complaint: Ventriculomegaly on CTH    History of Present Illness:   Tamika Chapin is a 47 year old female w/ PMHx HLD, depression, tobacco use, and occasional MJ use who presented on 2022 after ventriculomegaly was incidentally found on a recent CTH    The pt had previously been complaining of a painful lump behind the R ear for the past 2wks, now improved. She originally visited her PCP for this issue who was concerned for potential mastoiditis and so obtained a CTH. This returned with diffuse ventriculomegaly w/o transependymal flow concerning for compensated/early communicating hydrocephalus. Unfortunately the pt has never had an head imaging in the past and so no comparison could be made. At this time, it was discovered that the pt has been having intermittent, brief HA's when bending down or coughing and so she was advised to present herself to the ED    Concerning these intermittent HA's, the pt isn't sure exactly how long this has been going on but believes it's somewhere between a few months to a year. She describes these as a pressure on the top of her head that will only last a few seconds before dissipating after a few seconds only sometimes when he bends over or coughs. This is never associated with any other symptoms such as nausea, dizziness, or vision changes. She also denies any morning HA's, memory concerns, weakness, numbness/tingling, balance/gait issues, fever/chills, cough, rhinorrhea, chest pain, SOB, night sweats, or weight loss. She does note that she feels she's had a few more HA's than normal in recent times, but states they are always mild and will  either go away on their own or with OTC medications.    ROS  A comprehensive ROS was performed and pertinent findings were included in HPI.     PMH  Past Medical History:   Diagnosis Date     Cervical high risk HPV (human papillomavirus) test positive 09/19/2019    See problem list     Past Surgical History:   Procedure Laterality Date     ZZC APPENDECTOMY  07/1995       Medications   I have personally reviewed the patient's medication list.     Allergies  I have personally reviewed the patient's allergy list.     Social History  Social History     Socioeconomic History     Marital status:      Spouse name: Not on file     Number of children: 0     Years of education: 16     Highest education level: Not on file   Occupational History     Occupation: [employer]     Employer: [employer]     Comment: started in 2005   Tobacco Use     Smoking status: Current Every Day Smoker     Packs/day: 0.50     Years: 30.00     Pack years: 15.00     Types: Cigarettes     Start date: 6/1/1992     Smokeless tobacco: Never Used     Tobacco comment: Quite Date Set for 2/14/222   Substance and Sexual Activity     Alcohol use: Yes     Comment: Occ. few times a month - couple of drinks     Drug use: No     Sexual activity: Yes     Partners: Male     Birth control/protection: Male Surgical     Comment:  had vasectomy   Other Topics Concern     Parent/sibling w/ CABG, MI or angioplasty before 65F 55M? No   Social History Narrative    Balanced Diet - Yes    Osteoporosis Prevention Measures - Dairy servings per day: 2    Regular Exercise -  No Describe     Dental Exam - YES - Date: 10/09    Eye Exam - YES - Date: 6/09    Self Breast Exam - No    Abuse: Current or Past (Physical, Sexual or Emotional)- No    Do you feel safe in your environment - Yes    Guns stored in the home - Yes    Sunscreen used - Yes    Seatbelts used - Yes    Lipids -  YES - Date: 6/08    Glucose -  YES - Date: 6/08    Colon Cancer Screening  "- No    Hemoccults - NO    Pap Test -  YES - Date: 6/08    Do you have any concerns about STD's -  No    Mammography - NO    DEXA - NO    Immunizations reviewed and up to date - No    JARED Beach CMA    2/9/10                         Social Determinants of Health     Financial Resource Strain: Not on file   Food Insecurity: Not on file   Transportation Needs: Not on file   Physical Activity: Not on file   Stress: Not on file   Social Connections: Not on file   Intimate Partner Violence: Not on file   Housing Stability: Not on file     Family History    Family History   Problem Relation Age of Onset     Diabetes Father      Hypertension Father      Respiratory Maternal Grandmother         emphysema     Diabetes Maternal Grandmother      Cancer Paternal Grandmother         lung     Other Cancer Paternal Grandmother      Depression Sister        Physical Examination   Vitals: /70   Pulse 75   Temp 98  F (36.7  C) (Oral)   Resp 18   Ht 1.702 m (5' 7\")   Wt 111.1 kg (245 lb)   SpO2 99%   BMI 38.37 kg/m    General: Lying in bed, NAD  Head: NC/AT  Eyes: no icterus, op pink and moist  Cardiac: Appears well-perfused  Respiratory: No increased work of breathing on RA  GI: Soft, non-distended  Skin: No rash or lesion on exposed skin  Psych: Mood pleasant, affect congruent  Neuro:  Mental status: Awake, alert, attentive, oriented to self, time, place, and circumstance. Language is fluent and coherent with intact comprehension of complex commands, naming and repetition. Luria testing is intact. Registration and recall is 3/3  Cranial nerves: PERRL, conjugate gaze, EOMI w/o nystagmus, visual fields intact bilaterally, facial sensation intact, no facial asymmetry, hearing intact to conversation, no dysarthria, tongue is midline, shoulder shrug 5/5 bilaterally  Motor: Normal bulk and tone. No abnormal movements. 5/5 strength bilaterally in bilateral shoulder abduction/adduction, elbow flexion/extension, , hip flexion, " and dorsiflexion/plantarflexion  Reflexes: Brisk but symmetric reflexes in the bilateral biceps, brachioradialis, knees, and ankles. Neg Gooden, down-going toes, no clonus  Sensory: Intact to light touch in all extremities w/o extinction. Neg Romberg  Coordination: FNF and HS without ataxia or dysmetria  Gait: Normal width, stride length, turn, and arm swing. Station normal. Heel, toe, and tandem walk intact.    Investigations   I have personally reviewed pertinent labs, tests, and radiological imaging. Discussion of notable findings is included under Impression.     Was patient transferred from outside hospital?   No    Impression  Tamika Chapin is a 47 year old female w/ PMHx HLD, depression, tobacco use, and occasional MJ use who presented on 6/24/2022 after ventriculomegaly was incidentally found on a recent CTH. This is somewhat questionably symptomatic with the pt describing intermittent HA's with coughing and bending down. Still, as this has remained stable for many months, no acute intervention is indicated at this time and it's possible this ventriculomegaly is simply the pt's natural anatomy. Would recommend outpatient work-up to assess for possible causes of hydrocephalus with an MRI Brain and Neuro follow-up    Recommendations  - No further neurologic work-up or intervention is needed at this time and the pt is cleared for discharge from a neurologic perspective  - Outpatient MRI Brain w/ and w/o contrast  - Outpatient Neurology referral   - Pt was advised to return to the ED if she develops concerning symptoms such as a persistent HA, persistent nausea, vision changes, speech changes, weakness, numbness/tingling, facial droop, balance/gait difficulties, etc    Thank you for involving Neurology in the care of Tamika Chapin.  Please do not hesitate to call with questions/concerns (consult pager [phone]).      Patient was seen and discussed with Dr. Graff.    Alaina King MD  Neurology PGY2

## 2022-06-25 NOTE — DISCHARGE INSTRUCTIONS
Please make an appointment to follow up with Neurology Clinic (phone: [phone]) as soon as possible.    Neurology has ordered an MRI to be done as an outpatient that they should schedule with you.    Return to the ED if you develop confusion, falls, numbness, tingling, weakness, dizziness, inability to walk, speech difficulty, severe headache, uncontrollable vomiting, or any new or worsening concerns.

## 2022-06-30 ENCOUNTER — ANCILLARY PROCEDURE (OUTPATIENT)
Dept: MRI IMAGING | Facility: CLINIC | Age: 47
End: 2022-06-30
Attending: STUDENT IN AN ORGANIZED HEALTH CARE EDUCATION/TRAINING PROGRAM
Payer: COMMERCIAL

## 2022-06-30 DIAGNOSIS — G93.89 CEREBRAL VENTRICULOMEGALY: ICD-10-CM

## 2022-06-30 PROCEDURE — 70553 MRI BRAIN STEM W/O & W/DYE: CPT | Mod: GC | Performed by: RADIOLOGY

## 2022-06-30 PROCEDURE — A9585 GADOBUTROL INJECTION: HCPCS | Performed by: RADIOLOGY

## 2022-06-30 RX ORDER — GADOBUTROL 604.72 MG/ML
15 INJECTION INTRAVENOUS ONCE
Status: COMPLETED | OUTPATIENT
Start: 2022-06-30 | End: 2022-06-30

## 2022-06-30 RX ADMIN — GADOBUTROL 11 ML: 604.72 INJECTION INTRAVENOUS at 09:11

## 2022-06-30 NOTE — DISCHARGE INSTRUCTIONS
MRI Contrast Discharge Instructions    The IV contrast you received today will pass out of your body in your  urine. This will happen in the next 24 hours. You will not feel this process.  Your urine will not change color.    Drink at least 4 extra glasses of water or juice today (unless your doctor  has restricted your fluids). This reduces the stress on your kidneys.  You may take your regular medicines.    If you are on dialysis: It is best to have dialysis today.    If you have a reaction: Most reactions happen right away. If you have  any new symptoms after leaving the hospital (such as hives or swelling),  call your hospital at the correct number below. Or call your family doctor.  If you have breathing distress or wheezing, call 911.    Special instructions: ***    I have read and understand the above information.    Signature:______________________________________ Date:___________    Staff:__________________________________________ Date:___________     Time:__________    [place] [facility] Departments:    ___[place]: [phone]  ___[facility]: [phone]  ___[place]: [phone] ___[facility]: [phone]  ___[facility]: [phone]  ___[facility]: [phone]  ___[phone]  ___[facility]: [phone]  ___[phone]

## 2022-07-21 NOTE — TELEPHONE ENCOUNTER
FUTURE VISIT INFORMATION      FUTURE VISIT INFORMATION:    Date: 8/24/2022    Time: 230pm    [place] [facility] [place]  REFERRAL INFORMATION:    Referring provider:  Dr. Rader     Referring providers clinic:  [facility] ED     Reason for visit/diagnosis  Cerebral Ventriculomegaly     RECORDS REQUESTED FROM:       Clinic name Comments Records Status Imaging Status   Internal ED Visit-6/24/2022    CT Head-6/24/2022    MR Brain-6/30/2022 Epic PACS

## 2022-08-22 ENCOUNTER — MYC MEDICAL ADVICE (OUTPATIENT)
Dept: NEUROLOGY | Facility: CLINIC | Age: 47
End: 2022-08-22

## 2022-08-24 ENCOUNTER — PRE VISIT (OUTPATIENT)
Dept: NEUROLOGY | Facility: CLINIC | Age: 47
End: 2022-08-24

## 2022-08-24 ENCOUNTER — VIRTUAL VISIT (OUTPATIENT)
Dept: NEUROLOGY | Facility: CLINIC | Age: 47
End: 2022-08-24
Attending: EMERGENCY MEDICINE
Payer: COMMERCIAL

## 2022-08-24 DIAGNOSIS — G93.89 CEREBRAL VENTRICULOMEGALY: ICD-10-CM

## 2022-08-24 PROCEDURE — 99205 OFFICE O/P NEW HI 60 MIN: CPT | Mod: 95 | Performed by: PSYCHIATRY & NEUROLOGY

## 2022-08-24 NOTE — PROGRESS NOTES
DEPARTMENT OF NEUROLOGY    Patient Name:  Tamika Chapin  MRN:  [id number]    :  1975  Date of Clinic Visit:  2022  Primary Care Provider:  Dona Hannah        FOLLOW UP APPOINTMENT    CC: Ventriculomegaly    IMPRESSION/RECOMMENDATIONS:   Tamika Chapin is a 47yr old female who presented to clinic for follow-up of incidentally found diffuse ventriculomegaly. This visit was performed virtually as the pt has recently come down with COVID    #Asymptomatic Ventriculomegaly  #Concern for AYSHA  Pt presented after being incidentally found to have diffuse ventriculomegaly in 2022. Then and now, she appears to be largely asymptomatic with her only potential symptom being a brief episode of pain that intermittently occurs while coughing, potentially a sign of elevated ICP. However, this has not progressed since  and even then her ventriculomegaly had likely been long-standing. Other potential symptoms include increased fatigue and morning brain fog. However, I would be more prone to attribute this to something such as untreated AYSHA - pt admits to snoring - rather than elevated ICP, vicky as the pt lacks a positional HA. Without other symptoms of elevated ICP, it is highly unlikely that the pt's ventriculomegaly is pathologic and is more likely to be congenital in origin. Should a pathologic process been at play leading to communicating hydrocephalus, it would have been expected to have presented itself by this time. As such, I do not believe that further testing or imaging is needed at this time and would instead encourage the pt to monitor her symptoms. If they worsen, could consider repeat imaging or an LP  - Continue to monitor symptoms such as more persistent pain with coughing, pain with bending over/lying down, vision changes, nausea, dizziness, difficulty with walking/balance, or increased somnolence   - if symptoms worsen, could consider repeat imaging or LP at that time  - PCP  could consider a Sleep Medicine Referral to assess for underlying AYSHA once pt has recovered from COVID  - Follow-up with me as needed    Patient has been seen with Dr. Medellin who agrees with my assessment and plan    Alaina King MD  [facility] Department of Neurology PGY3      HPI:   Tamika Chapin is a 47yr old female w/ PMHx HLD, tobacco use, and depression who presents with incidentally found ventriculometgaly. She was also diagnosed with COVID in recent days    The patient had previously been suffering from a painful lump - now resolved - on the back of her R ear for 2wks. Concerned for mastoiditis, her PCP obtained a CTH which noted diffusely enlarged ventricles concerning for hydrocephalus. No prior imaging was available. Upon further discussion with the patient, she endorsed intermittent brief pains in her head when bending down or coughing and so the pt was encouraged to present to the ED for further imaging and to be seen by Neurology. In the ED, the pt was afebrile and hemodynamically stable. MRI Brain at that time again noted diffuse ventriculomegaly w/o signs of obstruction suggesting a communicating hydrocephalus. However, the pt reported that her HA's had been going on for a few months to a year, manifesting as a painful pressure on the top of her head that would occur sometimes when bending over or coughing without other symptoms. She also endorsed mild general HA's that resolved w/ OTC medications. As the pt was largely asymptomatic, had a normal exam, and imaging indicated to emergent need for intervention, she was discharged w/ plan for outpatient follow-up for which she presents to clinic today.    She reports that in the interim she has been doing well. She will still get mild headaches every now and again and is now only having head pains intermittently with coughing. She denies any pain with bending over, straining in the bathroom, or after waking from a night's sleep.  "Otherwise she has noticed some mental fog and slowed processing, especially in the morning and getting better throughout the day. She wonders if this could be due to AYSHA as she snores heavily. She denies any direct somnolence, lightheadedness, vertigo, vision changes, speech changes, swallow changes, hearing changes, balance/gait difficulties, weakness, numbness/tingling, or changes in coordination    Vital signs:                         Estimated body mass index is 38.37 kg/m  as calculated from the following:    Height as of 6/24/22: 1.702 m (5' 7\").    Weight as of 6/24/22: 111.1 kg (245 lb).  There were no vitals taken for this visit.      Examination: PERFORMED VIA A VISUAL VISIT    -General: Sitting comfortably on the chair. No acute distress    -Neurological:     --MS: Patient is alert, attentive, and oriented to person, place, time, and situation. Able to provide a detailed history and follow commands w/o issue. Speech is clear and fluid    --CNs: Conjugate gaze, EOMI w/o nystagmus, no facial asymmetry noted, hearing intact to conversation, no dysarthria, tongue is midline    --Motor: No abnormal movements noted. Strength is anti-gravity in all extremities    --Reflexes: Unable to perform    --Sensory: Unable to perform    --Coordination: Able to touch fingers to nose w/ eyes closed and arms outstretched w/o issue    --Gait: Stands with feet normally spaced. Gait is steady.      INVESTIGATIONS:  All available and relevant labs, imaging, and other procedures were reviewed with the patient at this visit. Those of particular significance are listed below:    CTH (6/24/2022)  Impression:  1. Diffuse ventriculomegaly without definitive transependymal flow of CSF. Cannot exclude an early or compensated communicating hydrocephalus.   2. If there are prior studies available for comparison they could be of value    MRI Brain (6/30/2022)  IMPRESSION:   1. Marked dilation of the lateral and third ventricles.   2. No " intraventricular or obstructing masses identified, the fourth ventricle is not enlarged, and the cerebral aqueduct and fourth ventricular outflow tracts are patent by flow voids.   3. Findings are consistent with communicating hydrocephalus

## 2022-08-24 NOTE — TELEPHONE ENCOUNTER
I returned call to patient and let her know we will change today's visit to video visit due to current covid positive status.     Milana RENTERIA

## 2022-08-24 NOTE — PROGRESS NOTES
Tamika is a 47 year old who is being evaluated via a billable video visit.      How would you like to obtain your AVS? MyChart  If the video visit is dropped, the invitation should be resent by: Send to e-mail at: [email or web address]  Will anyone else be joining your video visit? No        Video-Visit Details    Video Start Time: 15:00    Type of service:  Video Visit    Video End Time:16:00    Originating Location (pt. Location): Home    Distant Location (provider location):  [facility] [place]     Platform used for Video Visit: [employer]ia

## 2022-08-24 NOTE — PATIENT INSTRUCTIONS
- Continue to watch for these kinds of symptoms: more persistent pain with coughing, pain with bending over/lying down, vision changes, nausea, dizziness, difficulty with walking/balance, or increased somnolence  - Follow-up with me on an as needed basis if your symptoms are worsening

## 2022-08-24 NOTE — LETTER
2022       RE: Tamika Chapin  [phone]  [place]  [facility] [address]     Dear Colleague,    Thank you for referring your patient, Tamika Chapin, to the [facility] NEUROLOGY [facility] [place] [facility]. Please see a copy of my visit note below.      DEPARTMENT OF NEUROLOGY    Patient Name:  Tamika Chapin  MRN:  [id number]    :  1975  Date of Clinic Visit:  2022  Primary Care Provider:  Dona Hannah        FOLLOW UP APPOINTMENT    CC: Ventriculomegaly    IMPRESSION/RECOMMENDATIONS:   Tamika Chapin is a 47yr old female who presented to clinic for follow-up of incidentally found diffuse ventriculomegaly. This visit was performed virtually as the pt has recently come down with COVID    #Asymptomatic Ventriculomegaly  #Concern for AYSHA  Pt presented after being incidentally found to have diffuse ventriculomegaly in 2022. Then and now, she appears to be largely asymptomatic with her only potential symptom being a brief episode of pain that intermittently occurs while coughing, potentially a sign of elevated ICP. However, this has not progressed since  and even then her ventriculomegaly had likely been long-standing. Other potential symptoms include increased fatigue and morning brain fog. However, I would be more prone to attribute this to something such as untreated AYSHA - pt admits to snoring - rather than elevated ICP, vicky as the pt lacks a positional HA. Without other symptoms of elevated ICP, it is highly unlikely that the pt's ventriculomegaly is pathologic and is more likely to be congenital in origin. Should a pathologic process been at play leading to communicating hydrocephalus, it would have been expected to have presented itself by this time. As such, I do not believe that further testing or imaging is needed at this time and would instead encourage the pt to monitor her symptoms. If they worsen,  could consider repeat imaging or an LP  - Continue to monitor symptoms such as more persistent pain with coughing, pain with bending over/lying down, vision changes, nausea, dizziness, difficulty with walking/balance, or increased somnolence   - if symptoms worsen, could consider repeat imaging or LP at that time  - PCP could consider a Sleep Medicine Referral to assess for underlying AYSHA once pt has recovered from COVID  - Follow-up with me as needed    Patient has been seen with Dr. Medellin who agrees with my assessment and plan    Alaina King MD  [facility] Department of Neurology PGY3      HPI:   Tamika Chapin is a 47yr old female w/ PMHx HLD, tobacco use, and depression who presents with incidentally found ventriculometgaly. She was also diagnosed with COVID in recent days    The patient had previously been suffering from a painful lump - now resolved - on the back of her R ear for 2wks. Concerned for mastoiditis, her PCP obtained a CTH which noted diffusely enlarged ventricles concerning for hydrocephalus. No prior imaging was available. Upon further discussion with the patient, she endorsed intermittent brief pains in her head when bending down or coughing and so the pt was encouraged to present to the ED for further imaging and to be seen by Neurology. In the ED, the pt was afebrile and hemodynamically stable. MRI Brain at that time again noted diffuse ventriculomegaly w/o signs of obstruction suggesting a communicating hydrocephalus. However, the pt reported that her HA's had been going on for a few months to a year, manifesting as a painful pressure on the top of her head that would occur sometimes when bending over or coughing without other symptoms. She also endorsed mild general HA's that resolved w/ OTC medications. As the pt was largely asymptomatic, had a normal exam, and imaging indicated to emergent need for intervention, she was discharged w/ plan for outpatient follow-up for  "which she presents to clinic today.    She reports that in the interim she has been doing well. She will still get mild headaches every now and again and is now only having head pains intermittently with coughing. She denies any pain with bending over, straining in the bathroom, or after waking from a night's sleep. Otherwise she has noticed some mental fog and slowed processing, especially in the morning and getting better throughout the day. She wonders if this could be due to AYSHA as she snores heavily. She denies any direct somnolence, lightheadedness, vertigo, vision changes, speech changes, swallow changes, hearing changes, balance/gait difficulties, weakness, numbness/tingling, or changes in coordination    Vital signs:                         Estimated body mass index is 38.37 kg/m  as calculated from the following:    Height as of 6/24/22: 1.702 m (5' 7\").    Weight as of 6/24/22: 111.1 kg (245 lb).  There were no vitals taken for this visit.      Examination: PERFORMED VIA A VISUAL VISIT    -General: Sitting comfortably on the chair. No acute distress    -Neurological:     --MS: Patient is alert, attentive, and oriented to person, place, time, and situation. Able to provide a detailed history and follow commands w/o issue. Speech is clear and fluid    --CNs: Conjugate gaze, EOMI w/o nystagmus, no facial asymmetry noted, hearing intact to conversation, no dysarthria, tongue is midline    --Motor: No abnormal movements noted. Strength is anti-gravity in all extremities    --Reflexes: Unable to perform    --Sensory: Unable to perform    --Coordination: Able to touch fingers to nose w/ eyes closed and arms outstretched w/o issue    --Gait: Stands with feet normally spaced. Gait is steady.      INVESTIGATIONS:  All available and relevant labs, imaging, and other procedures were reviewed with the patient at this visit. Those of particular significance are listed below:    [facility] (6/24/2022)  Impression:  1. Diffuse " ventriculomegaly without definitive transependymal flow of CSF. Cannot exclude an early or compensated communicating hydrocephalus.   2. If there are prior studies available for comparison they could be of value    MRI Brain (6/30/2022)  IMPRESSION:   1. Marked dilation of the lateral and third ventricles.   2. No intraventricular or obstructing masses identified, the fourth ventricle is not enlarged, and the cerebral aqueduct and fourth ventricular outflow tracts are patent by flow voids.   3. 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 King' findings and plan of care as documented in Dr. King' note. I personally performed salient aspects of the history and neurological examination.        I personally reviewed the medications and labs. I personally viewed the imaging, and agree with the interpretation documented by the resident.       [place] findings:    Ms. Chapin is being seen as a new patient for evaluation after incidental finding of diffuse ventriculomegaly on MRI brain.  CT head was obtained to evaluate for painful lump in the mastoid area which revealed enlarged ventricles and a follow-up MRI brain was obtained which confirmed this finding.  MRI brain was reviewed and did not provide evidence of obstruction or hydrocephalus.  Reassuringly, the patient is largely asymptomatic without evidence of symptoms associated with hydrocephalus and neurologic exam is nonfocal.  We suspect that the patient's MRI finding is likely congenital and/or longstanding in origin.  We discussed symptoms associated with increased intracranial pressure for which patient did not endorse.  We discussed that if she were to develop any symptoms associated with elevated intracranial pressure, to seek immediate evaluation including a lumbar  puncture with opening pressure.  No further work-up required at this time.  Patient does endorse snoring and may benefit from a sleep study for evaluation of obstructive sleep apnea.  Follow-up as needed.      Date of Service (when I saw the patient): 08/24/22       Time spent with patient: 30 minutes   60 minutes spent on date of encounter doing chart reviews and exam and documentation and further activities as noted above.          Rosa Maria Medellin DO, MA    of Neurology    [facility]       Sincerely,    Alaina King MD

## 2022-08-24 NOTE — TELEPHONE ENCOUNTER
[facility] Call Center    Phone Message    May a detailed message be left on voicemail: yes     Reason for Call: Other: Patient is calling in to update that she is still testing positive for COVID 19. Patient is wondering if a virtual visit can be completed for visit today 8/24/22.   Writer tried to reschedule but first available is 2/2023.    Please contact patient ASAP for scheduling options.    Action Taken: Message routed to:  [facility] ([facility] Neurology    Travel Screening: Not Applicable

## 2022-12-26 ENCOUNTER — HEALTH MAINTENANCE LETTER (OUTPATIENT)
Age: 47
End: 2022-12-26

## 2023-02-24 DIAGNOSIS — F43.23 ADJUSTMENT DISORDER WITH MIXED ANXIETY AND DEPRESSED MOOD: ICD-10-CM

## 2023-02-25 NOTE — TELEPHONE ENCOUNTER
Appointments in Next Year    Mar 24, 2023 11:00 AM  (Arrive by 10:40 AM)  Preventative Adult Visit with LIV Heath CNP  [facility] ([facility] ) [phone]   Mar 31, 2023  1:15 PM  (Arrive by 1:00 PM)  Screening Mammogram with FKMA1  [facility] [place] ([facility] ) [phone]

## 2023-03-19 DIAGNOSIS — F43.23 ADJUSTMENT DISORDER WITH MIXED ANXIETY AND DEPRESSED MOOD: ICD-10-CM

## 2023-03-21 NOTE — TELEPHONE ENCOUNTER
Appointments in Next Year    Mar 24, 2023 11:00 AM  (Arrive by 10:40 AM)  Preventative Adult Visit with LIV Heath CNP  [facility] ([facility] ) [phone]   Mar 31, 2023  1:15 PM  (Arrive by 1:00 PM)  Screening Mammogram with FKMA1  [facility] [place] ([facility] ) [phone]

## 2023-03-24 ENCOUNTER — OFFICE VISIT (OUTPATIENT)
Dept: FAMILY MEDICINE | Facility: CLINIC | Age: 48
End: 2023-03-24
Payer: COMMERCIAL

## 2023-03-24 VITALS
HEIGHT: 67 IN | WEIGHT: 244.9 LBS | DIASTOLIC BLOOD PRESSURE: 71 MMHG | HEART RATE: 78 BPM | BODY MASS INDEX: 38.44 KG/M2 | OXYGEN SATURATION: 99 % | TEMPERATURE: 97.5 F | RESPIRATION RATE: 23 BRPM | SYSTOLIC BLOOD PRESSURE: 133 MMHG

## 2023-03-24 DIAGNOSIS — Z01.84 IMMUNITY STATUS TESTING: ICD-10-CM

## 2023-03-24 DIAGNOSIS — R06.83 SNORING: ICD-10-CM

## 2023-03-24 DIAGNOSIS — Z00.00 ROUTINE GENERAL MEDICAL EXAMINATION AT A HEALTH CARE FACILITY: Primary | ICD-10-CM

## 2023-03-24 DIAGNOSIS — Z13.0 SCREENING FOR DISORDER OF BLOOD AND BLOOD-FORMING ORGANS: ICD-10-CM

## 2023-03-24 DIAGNOSIS — E66.01 CLASS 2 SEVERE OBESITY DUE TO EXCESS CALORIES WITH SERIOUS COMORBIDITY AND BODY MASS INDEX (BMI) OF 38.0 TO 38.9 IN ADULT (H): ICD-10-CM

## 2023-03-24 DIAGNOSIS — Z13.6 CARDIOVASCULAR SCREENING; LDL GOAL LESS THAN 160: ICD-10-CM

## 2023-03-24 DIAGNOSIS — G91.9 HYDROCEPHALUS IN ADULT (H): ICD-10-CM

## 2023-03-24 DIAGNOSIS — G93.89 CEREBRAL VENTRICULOMEGALY: ICD-10-CM

## 2023-03-24 DIAGNOSIS — F43.23 ADJUSTMENT DISORDER WITH MIXED ANXIETY AND DEPRESSED MOOD: ICD-10-CM

## 2023-03-24 DIAGNOSIS — R53.83 DECREASED ENERGY: ICD-10-CM

## 2023-03-24 DIAGNOSIS — E66.812 CLASS 2 SEVERE OBESITY DUE TO EXCESS CALORIES WITH SERIOUS COMORBIDITY AND BODY MASS INDEX (BMI) OF 38.0 TO 38.9 IN ADULT (H): ICD-10-CM

## 2023-03-24 DIAGNOSIS — Z13.29 SCREENING FOR THYROID DISORDER: ICD-10-CM

## 2023-03-24 DIAGNOSIS — Z13.1 SCREENING FOR DIABETES MELLITUS (DM): ICD-10-CM

## 2023-03-24 LAB
ALBUMIN SERPL-MCNC: 3.8 G/DL (ref 3.4–5)
ALP SERPL-CCNC: 107 U/L (ref 40–150)
ALT SERPL W P-5'-P-CCNC: 41 U/L (ref 0–50)
ANION GAP SERPL CALCULATED.3IONS-SCNC: 4 MMOL/L (ref 3–14)
AST SERPL W P-5'-P-CCNC: 24 U/L (ref 0–45)
BILIRUB SERPL-MCNC: 0.6 MG/DL (ref 0.2–1.3)
BUN SERPL-MCNC: 13 MG/DL (ref 7–30)
CALCIUM SERPL-MCNC: 8.6 MG/DL (ref 8.5–10.1)
CHLORIDE BLD-SCNC: 106 MMOL/L (ref 94–109)
CHOLEST SERPL-MCNC: 241 MG/DL
CO2 SERPL-SCNC: 25 MMOL/L (ref 20–32)
CREAT SERPL-MCNC: 0.77 MG/DL (ref 0.52–1.04)
ERYTHROCYTE [DISTWIDTH] IN BLOOD BY AUTOMATED COUNT: 13.8 % (ref 10–15)
FASTING STATUS PATIENT QL REPORTED: YES
GFR SERPL CREATININE-BSD FRML MDRD: >90 ML/MIN/1.73M2
GLUCOSE BLD-MCNC: 105 MG/DL (ref 70–99)
HCT VFR BLD AUTO: 40.3 % (ref 35–47)
HDLC SERPL-MCNC: 38 MG/DL
HGB BLD-MCNC: 13 G/DL (ref 11.7–15.7)
LDLC SERPL CALC-MCNC: 163 MG/DL
MCH RBC QN AUTO: 28.3 PG (ref 26.5–33)
MCHC RBC AUTO-ENTMCNC: 32.3 G/DL (ref 31.5–36.5)
MCV RBC AUTO: 88 FL (ref 78–100)
NONHDLC SERPL-MCNC: 203 MG/DL
PLATELET # BLD AUTO: 349 10E3/UL (ref 150–450)
POTASSIUM BLD-SCNC: 4 MMOL/L (ref 3.4–5.3)
PROT SERPL-MCNC: 7.9 G/DL (ref 6.8–8.8)
RBC # BLD AUTO: 4.59 10E6/UL (ref 3.8–5.2)
SODIUM SERPL-SCNC: 135 MMOL/L (ref 133–144)
TRIGL SERPL-MCNC: 199 MG/DL
TSH SERPL DL<=0.005 MIU/L-ACNC: 1.09 MU/L (ref 0.4–4)
WBC # BLD AUTO: 9.7 10E3/UL (ref 4–11)

## 2023-03-24 PROCEDURE — 87340 HEPATITIS B SURFACE AG IA: CPT | Performed by: NURSE PRACTITIONER

## 2023-03-24 PROCEDURE — 80053 COMPREHEN METABOLIC PANEL: CPT | Performed by: NURSE PRACTITIONER

## 2023-03-24 PROCEDURE — 99396 PREV VISIT EST AGE 40-64: CPT | Performed by: NURSE PRACTITIONER

## 2023-03-24 PROCEDURE — 99213 OFFICE O/P EST LOW 20 MIN: CPT | Mod: 25 | Performed by: NURSE PRACTITIONER

## 2023-03-24 PROCEDURE — 85027 COMPLETE CBC AUTOMATED: CPT | Performed by: NURSE PRACTITIONER

## 2023-03-24 PROCEDURE — 86704 HEP B CORE ANTIBODY TOTAL: CPT | Performed by: NURSE PRACTITIONER

## 2023-03-24 PROCEDURE — 86706 HEP B SURFACE ANTIBODY: CPT | Performed by: NURSE PRACTITIONER

## 2023-03-24 PROCEDURE — 36415 COLL VENOUS BLD VENIPUNCTURE: CPT | Performed by: NURSE PRACTITIONER

## 2023-03-24 PROCEDURE — 80061 LIPID PANEL: CPT | Performed by: NURSE PRACTITIONER

## 2023-03-24 PROCEDURE — 84443 ASSAY THYROID STIM HORMONE: CPT | Performed by: NURSE PRACTITIONER

## 2023-03-24 ASSESSMENT — ENCOUNTER SYMPTOMS
FREQUENCY: 0
HEMATURIA: 0
SORE THROAT: 0
WEAKNESS: 0
FEVER: 0
ARTHRALGIAS: 0
MYALGIAS: 0
NERVOUS/ANXIOUS: 0
BREAST MASS: 0
ABDOMINAL PAIN: 0
CHILLS: 0
EYE PAIN: 0
SHORTNESS OF BREATH: 0
CONSTIPATION: 0
PALPITATIONS: 0
PARESTHESIAS: 0
DIARRHEA: 0
DYSURIA: 0
NAUSEA: 0
DIZZINESS: 0
HEMATOCHEZIA: 0
HEADACHES: 1
COUGH: 0
HEARTBURN: 0
JOINT SWELLING: 0

## 2023-03-24 ASSESSMENT — PAIN SCALES - GENERAL: PAINLEVEL: NO PAIN (0)

## 2023-03-24 NOTE — PROGRESS NOTES
Answers for HPI/ROS submitted by the patient on 3/24/2023  Frequency of exercise:: None  Getting at least 3 servings of Calcium per day:: Yes  Diet:: Regular (no restrictions)  Taking medications regularly:: Yes  Medication side effects:: None  Bi-annual eye exam:: Yes  Dental care twice a year:: Yes  Sleep apnea or symptoms of sleep apnea:: Excessive snoring  abdominal pain: No  Blood in stool: No  Blood in urine: No  chest pain: No  chills: No  congestion: No  constipation: No  cough: No  diarrhea: No  dizziness: No  ear pain: No  eye pain: No  nervous/anxious: No  fever: No  frequency: No  genital sores: No  headaches: Yes  hearing loss: No  heartburn: No  arthralgias: No  joint swelling: No  peripheral edema: No  mood changes: No  myalgias: No  nausea: No  dysuria: No  palpitations: No  Skin sensation changes: No  sore throat: No  urgency: No  rash: No  shortness of breath: No  visual disturbance: No  weakness: No  pelvic pain: No  vaginal bleeding: No  vaginal discharge: No  tenderness: No  breast mass: No  breast discharge: No  Additional concerns today:: Yes       SUBJECTIVE:   CC: Tamika is an 48 year old who presents for preventive health visit.     Patient has been advised of split billing requirements and indicates understanding: Yes  Healthy Habits:     Getting at least 3 servings of Calcium per day:  Yes    Bi-annual eye exam:  Yes    Dental care twice a year:  Yes    Sleep apnea or symptoms of sleep apnea:  Excessive snoring    Diet:  Regular (no restrictions)    Frequency of exercise:  None    Taking medications regularly:  Yes    Medication side effects:  None    PHQ-2 Total Score: 0    Additional concerns today:  Yes    Today's PHQ-2 Score:   PHQ-2 ( 1999 Pf[employer]) 3/24/2023   Q1: Little interest or pleasure in doing things 0   Q2: Feeling down, depressed or hopeless 0   PHQ-2 Score 0   PHQ-2 Total Score (12-17 Years)- Positive if 3 or more points; Administer PHQ-A if positive -   Q1: Little interest  or pleasure in doing things Not at all   Q2: Feeling down, depressed or hopeless Not at all   PHQ-2 Score 0       Social History     Tobacco Use     Smoking status: Every Day     Packs/day: 0.50     Years: 30.00     Pack years: 15.00     Types: Cigarettes     Start date: 6/1/1992     Smokeless tobacco: Never     Tobacco comments:     Quite Date Set for 2/14/222   Substance Use Topics     Alcohol use: Yes     Comment: Occ. few times a month - couple of drinks         Alcohol Use 3/24/2023   Prescreen: >3 drinks/day or >7 drinks/week? No     Reviewed orders with patient.  Reviewed health maintenance and updated orders accordingly - Yes  Lab work is in process  Labs reviewed in EPIC  BP Readings from Last 3 Encounters:   03/24/23 133/71   06/24/22 120/70   06/24/22 121/81    Wt Readings from Last 3 Encounters:   03/24/23 111.1 kg (244 lb 14.4 oz)   06/24/22 111.1 kg (245 lb)   06/24/22 111.2 kg (245 lb 1.6 oz)                  Patient Active Problem List   Diagnosis     Tobacco use disorder     Hyperlipidemia LDL goal <160     Cervical high risk HPV (human papillomavirus) test positive     Obesity     Past Surgical History:   Procedure Laterality Date     ZZC APPENDECTOMY  07/1995       Social History     Tobacco Use     Smoking status: Every Day     Packs/day: 0.50     Years: 30.00     Pack years: 15.00     Types: Cigarettes     Start date: 6/1/1992     Smokeless tobacco: Never     Tobacco comments:     Quite Date Set for 2/14/222   Substance Use Topics     Alcohol use: Yes     Comment: Occ. few times a month - couple of drinks     Family History   Problem Relation Age of Onset     Diabetes Father      Hypertension Father      Respiratory Maternal Grandmother         emphysema     Diabetes Maternal Grandmother      Cancer Paternal Grandmother         lung     Other Cancer Paternal Grandmother      Depression Sister          Current Outpatient Medications   Medication Sig Dispense Refill     FLUoxetine (PROZAC) 20 MG  capsule Take 1 capsule (20 mg) by mouth daily 90 capsule 3     nicotine (NICODERM CQ) 14 MG/24HR 24 hr patch PLACE 1 PATCH ONTO THE SKIN EVERY 24 HOURS. 28 patch 1     No Known Allergies    Breast Cancer Screening:    Breast CA Risk Assessment (FHS-7) 2/7/2022   Do you have a family history of breast, colon, or ovarian cancer? No / Unknown     Mammogram Screening: Recommended annual mammography  Pertinent mammograms are reviewed under the imaging tab.    History of abnormal Pap smear: NO - age 30-65 PAP every 5 years with negative HPV co-testing recommended  PAP / HPV Latest Ref Rng & Units 2/8/2022 11/5/2020 9/19/2019   PAP   Negative for Intraepithelial Lesion or Malignancy (NILM) - -   PAP (Historical) - - NIL NIL   HPV16 Negative Negative Negative Negative   HPV18 Negative Negative Negative Negative   HRHPV Negative Negative Negative Positive(A)     Reviewed and updated as needed this visit by clinical staff                  Reviewed and updated as needed this visit by Provider                 Past Medical History:   Diagnosis Date     Cervical high risk HPV (human papillomavirus) test positive 09/19/2019    See problem list      Past Surgical History:   Procedure Laterality Date     [facility] APPENDECTOMY  07/1995       Review of Systems   Constitutional: Negative for chills and fever.   HENT: Negative for congestion, ear pain, hearing loss and sore throat.         Snoring increased lately in the last year and daytime lack of energy    Eyes: Negative for pain and visual disturbance.   Respiratory: Negative for cough and shortness of breath.    Cardiovascular: Negative for chest pain, palpitations and peripheral edema.   Gastrointestinal: Negative for abdominal pain, constipation, diarrhea, heartburn, hematochezia and nausea.   Breasts:  Negative for tenderness, breast mass and discharge.   Genitourinary: Negative for dysuria, frequency, genital sores, hematuria, pelvic pain, urgency, vaginal bleeding and vaginal  "discharge.   Musculoskeletal: Negative for arthralgias, joint swelling and myalgias.   Skin: Negative for rash.   Neurological: Positive for headaches. Negative for dizziness, weakness and paresthesias.        Was diagnosed with hydrocephalus and has noticed more frequent since diagnosed last year  Will not wake her up at night but will have them daily        Psychiatric/Behavioral: Negative for mood changes. The patient is not nervous/anxious.        OBJECTIVE:   /71 (BP Location: Right arm, Patient Position: Sitting, Cuff Size: Adult Large)   Pulse 78   Temp 97.5  F (36.4  C) (Oral)   Resp 23   Ht 1.69 m (5' 6.54\")   Wt 111.1 kg (244 lb 14.4 oz)   LMP  (LMP Unknown)   SpO2 99%   BMI 38.89 kg/m     Wt Readings from Last 4 Encounters:   03/24/23 111.1 kg (244 lb 14.4 oz)   06/24/22 111.1 kg (245 lb)   06/24/22 111.2 kg (245 lb 1.6 oz)   02/08/22 109.2 kg (240 lb 12.8 oz)       Physical Exam  GENERAL: healthy, alert and no distress  EYES: Eyes grossly normal to inspection and conjunctivae and sclerae normal  HENT: ear canals and TM's normal, nose and mouth without ulcers or lesions  NECK: no adenopathy, no asymmetry, masses, or scars and thyroid normal to palpation  RESP: lungs clear to auscultation - no rales, rhonchi or wheezes  BREAST: normal without masses, tenderness or nipple discharge and no palpable axillary masses or adenopathy  CV: regular rates and rhythm, no murmur, click or rub, peripheral pulses strong and no peripheral edema  ABDOMEN: soft, nontender, no hepatosplenomegaly, no masses and bowel sounds normal   (female): normal female external genitalia, normal urethral meatus, vaginal mucosa pink, moist, well rugated, and normal cervix/adnexa/uterus without masses or discharge  MS: no gross musculoskeletal defects noted, no edema  SKIN: no suspicious lesions or rashes  NEURO: Normal strength and tone, mentation intact and speech normal  PSYCH: mentation appears normal, affect " normal/bright  LYMPH: no cervical, supraclavicular, axillary, or inguinal adenopathy    Diagnostic Test Results:  Labs reviewed in Epic  Results for orders placed or performed in visit on 03/24/23   Hepatitis B Surface Antibody     Status: None   Result Value Ref Range    Hepatitis B Surface Antibody Instrument Value 1.55 <8.00 m[IU]/mL    Hepatitis B Surface Antibody Nonreactive    HEPATITIS B CORE ANTIBODY     Status: Normal   Result Value Ref Range    Hepatitis B Core Antibody Total Nonreactive Nonreactive   HEPATITIS B SURFACE ANTIGEN     Status: Normal   Result Value Ref Range    Hepatitis B Surface Antigen Nonreactive Nonreactive   Lipid panel reflex to direct LDL Fasting     Status: Abnormal   Result Value Ref Range    Cholesterol 241 (H) <200 mg/dL    Triglycerides 199 (H) <150 mg/dL    Direct Measure HDL 38 (L) >=50 mg/dL    LDL Cholesterol Calculated 163 (H) <=100 mg/dL    Non HDL Cholesterol 203 (H) <130 mg/dL    Patient Fasting > 8hrs? Yes     Narrative    Cholesterol  Desirable:  <200 mg/dL    Triglycerides  Normal:  Less than 150 mg/dL  Borderline High:  150-199 mg/dL  High:  200-499 mg/dL  Very High:  Greater than or equal to 500 mg/dL    Direct Measure HDL  Female:  Greater than or equal to 50 mg/dL   Male:  Greater than or equal to 40 mg/dL    LDL Cholesterol  Desirable:  <100mg/dL  Above Desirable:  100-129 mg/dL   Borderline High:  130-159 mg/dL   High:  160-189 mg/dL   Very High:  >= 190 mg/dL    Non HDL Cholesterol  Desirable:  130 mg/dL  Above Desirable:  130-159 mg/dL  Borderline High:  160-189 mg/dL  High:  190-219 mg/dL  Very High:  Greater than or equal to 220 mg/dL   CBC with platelets     Status: Normal   Result Value Ref Range    WBC Count 9.7 4.0 - 11.0 10e3/uL    RBC Count 4.59 3.80 - 5.20 10e6/uL    Hemoglobin 13.0 11.7 - 15.7 g/dL    Hematocrit 40.3 35.0 - 47.0 %    MCV 88 78 - 100 fL    MCH 28.3 26.5 - 33.0 pg    MCHC 32.3 31.5 - 36.5 g/dL    RDW 13.8 10.0 - 15.0 %    Platelet Count  349 150 - 450 10e3/uL   Comprehensive metabolic panel     Status: Abnormal   Result Value Ref Range    Sodium 135 133 - 144 mmol/L    Potassium 4.0 3.4 - 5.3 mmol/L    Chloride 106 94 - 109 mmol/L    Carbon Dioxide (CO2) 25 20 - 32 mmol/L    Anion Gap 4 3 - 14 mmol/L    Urea Nitrogen 13 7 - 30 mg/dL    Creatinine 0.77 0.52 - 1.04 mg/dL    Calcium 8.6 8.5 - 10.1 mg/dL    Glucose 105 (H) 70 - 99 mg/dL    Alkaline Phosphatase 107 40 - 150 U/L    AST 24 0 - 45 U/L    ALT 41 0 - 50 U/L    Protein Total 7.9 6.8 - 8.8 g/dL    Albumin 3.8 3.4 - 5.0 g/dL    Bilirubin Total 0.6 0.2 - 1.3 mg/dL    GFR Estimate >90 >60 mL/min/1.73m2   TSH with free T4 reflex     Status: Normal   Result Value Ref Range    TSH 1.09 0.40 - 4.00 mU/L       ASSESSMENT/PLAN:   Tamika was seen today for physical.    Diagnoses and all orders for this visit:    Routine general medical examination at a health care facility  -     REVIEW OF HEALTH MAINTENANCE PROTOCOL ORDERS    CARDIOVASCULAR SCREENING; LDL GOAL LESS THAN 160  -     Lipid panel reflex to direct LDL Fasting; Future    Screening for diabetes mellitus (DM)  -     Comprehensive metabolic panel; Future    Screening for disorder of blood and blood-forming organs  -     CBC with platelets; Future    Screening for thyroid disorder  -     TSH with free T4 reflex; Future    Cerebral ventriculomegaly  -     MR Brain w/o & w Contrast; Future    Snoring  -     Adult Sleep Eval & Management  Referral; Future    Decreased energy  -     Adult Sleep Eval & Management  Referral; Future    Hydrocephalus in adult (H)  -     MR Brain w/o & w Contrast; Future    Immunity status testing  -     Hepatitis B Surface Antibody  -     HEPATITIS B CORE ANTIBODY  -     HEPATITIS B SURFACE ANTIGEN    Adjustment disorder with mixed anxiety and depressed mood  -     FLUoxetine (PROZAC) 20 MG capsule; Take 1 capsule (20 mg) by mouth daily  The current medical regimen is effective.  Continue current  "medication regimen unchanged.    Obesity   Healthy diet and exercise reviewed.  Limit pop and juice intake.  Risks of obesity discussed.  Encourage exercise.  Limit TV/Computer/game time to one hour a day.    PLAN:   Patient needs to follow up in if no improvement,or sooner if worsening of symptoms or other symptoms develop.  FURTHER TESTING:       - MRI brain   CONSULTATION/REFERRAL to sleep study   Work on weight loss  Regular exercise  Will follow up and/or notify patient of  results via My Chart to determine further need for followup  Follow up office visit in one year for annual health maintenance exam, sooner PRN.    Patient has been advised of split billing requirements and indicates understanding: Yes      COUNSELING:  Reviewed preventive health counseling, as reflected in patient instructions  Special attention given to:        Regular exercise       Healthy diet/nutrition       Vision screening       Osteoporosis prevention/bone health       Colorectal Cancer Screening       Consider Hep C screening for all patients one time for ages 18-79 years       The 10-year ASCVD risk score (Brayden JOHN, et al., 2019) is: 7.3%    Values used to calculate the score:      Age: 48 years      Sex: Female      Is Non- : No      Diabetic: No      Tobacco smoker: Yes      Systolic Blood Pressure: 133 mmHg      Is BP treated: No      HDL Cholesterol: 38 mg/dL      Total Cholesterol: 241 mg/dL       Advance Care Planning      BMI:   Estimated body mass index is 38.37 kg/m  as calculated from the following:    Height as of 6/24/22: 1.702 m (5' 7\").    Weight as of 6/24/22: 111.1 kg (245 lb).   Weight management plan: Discussed healthy diet and exercise guidelines      She reports that she has been smoking cigarettes. She started smoking about 30 years ago. She has a 15.00 pack-year smoking history. She has never used smokeless tobacco.  Nicotine/Tobacco Cessation Plan:   Information offered: Patient not " interested at this time          LIV Heath CNP  [facility]

## 2023-03-24 NOTE — PATIENT INSTRUCTIONS
PLAN:   1.   Symptomatic therapy suggested: Continue current medications as prescribed.   Increase calcium to 1000mg and 1000iu Vit D   2.  Orders Placed This Encounter   Medications    FLUoxetine (PROZAC) 20 MG capsule     Sig: Take 1 capsule (20 mg) by mouth daily     Dispense:  90 capsule     Refill:  3     Orders Placed This Encounter   Procedures    REVIEW OF HEALTH MAINTENANCE PROTOCOL ORDERS    MR Brain w/o & w Contrast    Lipid panel reflex to direct LDL Fasting    CBC with platelets    Comprehensive metabolic panel    TSH with free T4 reflex    Hepatitis B Surface Antibody    HEPATITIS B CORE ANTIBODY    HEPATITIS B SURFACE ANTIGEN    Adult Sleep Eval & Management  Referral       3. Patient needs to follow up in if no improvement,or sooner if worsening of symptoms or other symptoms develop.  FURTHER TESTING:       - MRI brain   CONSULTATION/REFERRAL to sleep study   Work on weight loss  Regular exercise  Will follow up and/or notify patient of  results via My Chart to determine further need for followup  Follow up office visit in one year for annual health maintenance exam, sooner PRN.    Preventive Health Recommendations  Female Ages 40 to 49    Yearly exam:   See your health care provider every year in order to  Review health changes.   Discuss preventive care.    Review your medicines if your doctor prescribed any.    Get a Pap test every three years (unless you have an abnormal result and your provider advises testing more often).    If you get Pap tests with HPV test, you only need to test every 5 years, unless you have an abnormal result. You do not need a Pap test if your uterus was removed (hysterectomy) and you have not had cancer.    You should be tested each year for STDs (sexually transmitted diseases), if you're at risk.   Ask your doctor if you should have a mammogram.    Have a colonoscopy (test for colon cancer) if someone in your family has had colon cancer or polyps before age 50.      Have a cholesterol test every 5 years.     Have a diabetes test (fasting glucose) after age 45. If you are at risk for diabetes, you should have this test every 3 years.    Shots: Get a flu shot each year. Get a tetanus shot every 10 years.     Nutrition:   Eat at least 5 servings of fruits and vegetables each day.  Eat whole-grain bread, whole-wheat pasta and brown rice instead of white grains and rice.  Get adequate Calcium and Vitamin D.      Lifestyle  Exercise at least 150 minutes a week (an average of 30 minutes a day, 5 days a week). This will help you control your weight and prevent disease.  Limit alcohol to one drink per day.  No smoking.   Wear sunscreen to prevent skin cancer.  See your dentist every six months for an exam and cleaning.

## 2023-03-25 LAB
HBV CORE AB SERPL QL IA: NONREACTIVE
HBV SURFACE AB SERPL IA-ACNC: 1.55 M[IU]/ML
HBV SURFACE AB SERPL IA-ACNC: NONREACTIVE M[IU]/ML
HBV SURFACE AG SERPL QL IA: NONREACTIVE

## 2023-03-31 ENCOUNTER — ANCILLARY PROCEDURE (OUTPATIENT)
Dept: MAMMOGRAPHY | Facility: CLINIC | Age: 48
End: 2023-03-31
Attending: NURSE PRACTITIONER
Payer: COMMERCIAL

## 2023-03-31 DIAGNOSIS — Z12.31 VISIT FOR SCREENING MAMMOGRAM: ICD-10-CM

## 2023-03-31 PROCEDURE — 77067 SCR MAMMO BI INCL CAD: CPT | Mod: TC | Performed by: STUDENT IN AN ORGANIZED HEALTH CARE EDUCATION/TRAINING PROGRAM

## 2023-04-03 NOTE — RESULT ENCOUNTER NOTE
Kael Chapin,    Attached are your test results.  -Normal red blood cell (hgb) levels, normal white blood cell count and normal platelet levels.  -LDL(bad) cholesterol level is elevated, HDL(good) cholesterol level is low and your triglycerides are elevated which can increase your heart disease risk.  A diet high in fat and simple carbohydrates, genetics and being overweight can contribute to this. ADVISE: exercising 150 minutes of aerobic exercise per week (30 minutes for 5 days per week or 50 minutes for 3 days per week are options), eating a low saturated fat/low carbohydrate diet, and omega-3 fatty acids (fish oil) 0747-3822 mg daily are helpful to improve this. In 6 months, you should recheck your fasting cholesterol panel by scheduling a lab-only appointment.  -Liver and gallbladder tests (ALT,AST, Alk phos,bilirubin) are normal.  -Kidney function (GFR) is normal.  -Sodium is normal.  -Potassium is normal.  -Calcium is normal.  -Glucose is slight elevated and may be a sign of early diabetes (prediabetes). ADVISE:: eating a low carbohydrate diet, exercising, trying to lose weight (if necessary) and rechecking your glucose level in 12 months.  Your hepatitis antibodies show you are not immune to Hepatitis B and would benefit from the vaccine  -TSH (thyroid stimulating hormone) level is normal which indicates normal thyroid function. Please contact us if you have any questions.    Dona Hannah, CNP

## 2023-04-10 ENCOUNTER — ANCILLARY PROCEDURE (OUTPATIENT)
Dept: MRI IMAGING | Facility: CLINIC | Age: 48
End: 2023-04-10
Attending: NURSE PRACTITIONER
Payer: COMMERCIAL

## 2023-04-10 DIAGNOSIS — G91.9 HYDROCEPHALUS IN ADULT (H): ICD-10-CM

## 2023-04-10 DIAGNOSIS — G93.89 CEREBRAL VENTRICULOMEGALY: ICD-10-CM

## 2023-04-10 PROCEDURE — 70553 MRI BRAIN STEM W/O & W/DYE: CPT | Mod: TC | Performed by: RADIOLOGY

## 2023-04-10 PROCEDURE — A9585 GADOBUTROL INJECTION: HCPCS | Performed by: RADIOLOGY

## 2023-04-10 RX ORDER — GADOBUTROL 604.72 MG/ML
11 INJECTION INTRAVENOUS ONCE
Status: COMPLETED | OUTPATIENT
Start: 2023-04-10 | End: 2023-04-10

## 2023-04-10 RX ADMIN — GADOBUTROL 11 ML: 604.72 INJECTION INTRAVENOUS at 14:33

## 2023-04-12 NOTE — RESULT ENCOUNTER NOTE
Kael Chapin,    Attached are your test results.  Looks like your MRI appears stable which is reassuring   Please keep appointment with Neurology as planned    Please contact us if you have any questions.    Dona Hannah, CNP

## 2023-07-12 ENCOUNTER — MYC MEDICAL ADVICE (OUTPATIENT)
Dept: FAMILY MEDICINE | Facility: CLINIC | Age: 48
End: 2023-07-12
Payer: COMMERCIAL

## 2023-07-17 ENCOUNTER — VIRTUAL VISIT (OUTPATIENT)
Dept: FAMILY MEDICINE | Facility: CLINIC | Age: 48
End: 2023-07-17
Payer: COMMERCIAL

## 2023-07-17 DIAGNOSIS — E66.01 CLASS 2 SEVERE OBESITY DUE TO EXCESS CALORIES WITH SERIOUS COMORBIDITY AND BODY MASS INDEX (BMI) OF 38.0 TO 38.9 IN ADULT (H): Primary | ICD-10-CM

## 2023-07-17 DIAGNOSIS — R73.03 PREDIABETES: ICD-10-CM

## 2023-07-17 DIAGNOSIS — E66.812 CLASS 2 SEVERE OBESITY DUE TO EXCESS CALORIES WITH SERIOUS COMORBIDITY AND BODY MASS INDEX (BMI) OF 38.0 TO 38.9 IN ADULT (H): Primary | ICD-10-CM

## 2023-07-17 DIAGNOSIS — E78.5 HYPERLIPIDEMIA LDL GOAL <160: ICD-10-CM

## 2023-07-17 PROCEDURE — 99213 OFFICE O/P EST LOW 20 MIN: CPT | Mod: VID | Performed by: NURSE PRACTITIONER

## 2023-07-17 NOTE — PATIENT INSTRUCTIONS
PLAN:   1.   Symptomatic therapy suggested: will start on Wegovy once a week.  2.  Orders Placed This Encounter   Medications    Semaglutide-Weight Management (WEGOVY) 0.25 MG/0.5ML pen     Sig: Inject 0.25 mg Subcutaneous once a week     Dispense:  2 mL     Refill:  0     3. Patient needs to follow up in if no improvement,or sooner if worsening of symptoms or other symptoms develop.  Follow up in 3 months.

## 2023-07-17 NOTE — PROGRESS NOTES
Tamika is a 48 year old who is being evaluated via a billable video visit.      How would you like to obtain your AVS? MyChart  If the video visit is dropped, the invitation should be resent by: Send to e-mail at: [email or web address]  Will anyone else be joining your video visit? No        Subjective   Tamika is a 48 year old, presenting for the following health issues:  Weight Loss        7/17/2023     9:32 AM   Additional Questions   Roomed by Brooke     History of Present Illness       Reason for visit:  Weight loss    She eats 2-3 servings of fruits and vegetables daily.She consumes 1 sweetened beverage(s) daily.She exercises with enough effort to increase her heart rate 10 to 19 minutes per day.  She exercises with enough effort to increase her heart rate 3 or less days per week. She is missing 1 dose(s) of medications per week.  She is not taking prescribed medications regularly due to remembering to take.       Labs reviewed in EPIC  BP Readings from Last 3 Encounters:   03/24/23 133/71   06/24/22 120/70   06/24/22 121/81    Wt Readings from Last 3 Encounters:   03/24/23 111.1 kg (244 lb 14.4 oz)   06/24/22 111.1 kg (245 lb)   06/24/22 111.2 kg (245 lb 1.6 oz)                  Patient Active Problem List   Diagnosis     Tobacco use disorder     Hyperlipidemia LDL goal <160     Cervical high risk HPV (human papillomavirus) test positive     Class 2 severe obesity due to excess calories with serious comorbidity and body mass index (BMI) of 38.0 to 38.9 in adult (H)     Cerebral ventriculomegaly     Hydrocephalus in adult (H)     Past Surgical History:   Procedure Laterality Date     [facility] APPENDECTOMY  07/1995       Social History     Tobacco Use     Smoking status: Every Day     Packs/day: 0.50     Years: 30.00     Pack years: 15.00     Types: Cigarettes     Start date: 6/1/1992     Smokeless tobacco: Never     Tobacco comments:     Quite Date Set for 2/14/222   Substance Use Topics     Alcohol use: Yes      "Comment: Occ. few times a month - couple of drinks     Family History   Problem Relation Age of Onset     Diabetes Father      Hypertension Father      Respiratory Maternal Grandmother         emphysema     Diabetes Maternal Grandmother      Cancer Paternal Grandmother         lung     Other Cancer Paternal Grandmother      Depression Sister          Current Outpatient Medications   Medication Sig Dispense Refill     FLUoxetine (PROZAC) 20 MG capsule TAKE 2 CAPSULES BY MOUTH EVERY  capsule 3     nicotine (NICODERM CQ) 14 MG/24HR 24 hr patch PLACE 1 PATCH ONTO THE SKIN EVERY 24 HOURS. 28 patch 1     No Known Allergies      Review of Systems   Constitutional, HEENT, cardiovascular, pulmonary, gi and gu systems are negative, except as otherwise noted.      Objective           Vitals:  No vitals were obtained today due to virtual visit.  BP Readings from Last 1 Encounters:   03/24/23 133/71     Pulse Readings from Last 1 Encounters:   03/24/23 78     Wt Readings from Last 1 Encounters:   03/24/23 111.1 kg (244 lb 14.4 oz)     Ht Readings from Last 1 Encounters:   03/24/23 1.69 m (5' 6.54\")     Estimated body mass index is 38.89 kg/m  as calculated from the following:    Height as of 3/24/23: 1.69 m (5' 6.54\").    Weight as of 3/24/23: 111.1 kg (244 lb 14.4 oz).    Temp Readings from Last 1 Encounters:   03/24/23 97.5  F (36.4  C) (Oral)       Physical Exam   GENERAL: alert, no distress and obese  EYES: Eyes grossly normal to inspection.  No discharge or erythema, or obvious scleral/conjunctival abnormalities.  HENT: normal cephalic/atraumatic and oral mucous membranes moist  RESP: No audible wheeze, cough, or visible cyanosis.  No visible retractions or increased work of breathing.    MS: extremities normal- no gross deformities noted  SKIN: Visible skin clear. No significant rash, abnormal pigmentation or lesions.  NEURO: mentation intact  PSYCH: Mentation appears normal, affect normal/bright, judgement and " insight intact, normal speech and appearance well-groomed.    Office Visit on 03/24/2023   Component Date Value Ref Range Status     Hepatitis B Surface Antibody Instr* 03/24/2023 1.55  <8.00 m[IU]/mL Final     Hepatitis B Surface Antibody 03/24/2023 Nonreactive   Final    No antibody detected when the value is less than 8.00 mIU/mL.     Hepatitis B Core Antibody Total 03/24/2023 Nonreactive  Nonreactive Final     Hepatitis B Surface Antigen 03/24/2023 Nonreactive  Nonreactive Final     Cholesterol 03/24/2023 241 (H)  <200 mg/dL Final     Triglycerides 03/24/2023 199 (H)  <150 mg/dL Final     Direct Measure HDL 03/24/2023 38 (L)  >=50 mg/dL Final     LDL Cholesterol Calculated 03/24/2023 163 (H)  <=100 mg/dL Final     Non HDL Cholesterol 03/24/2023 203 (H)  <130 mg/dL Final     Patient Fasting > 8hrs? 03/24/2023 Yes   Final     WBC Count 03/24/2023 9.7  4.0 - 11.0 10e3/uL Final     RBC Count 03/24/2023 4.59  3.80 - 5.20 10e6/uL Final     Hemoglobin 03/24/2023 13.0  11.7 - 15.7 g/dL Final     Hematocrit 03/24/2023 40.3  35.0 - 47.0 % Final     MCV 03/24/2023 88  78 - 100 fL Final     MCH 03/24/2023 28.3  26.5 - 33.0 pg Final     MCHC 03/24/2023 32.3  31.5 - 36.5 g/dL Final     RDW 03/24/2023 13.8  10.0 - 15.0 % Final     Platelet Count 03/24/2023 349  150 - 450 10e3/uL Final     Sodium 03/24/2023 135  133 - 144 mmol/L Final     Potassium 03/24/2023 4.0  3.4 - 5.3 mmol/L Final     Chloride 03/24/2023 106  94 - 109 mmol/L Final     Carbon Dioxide (CO2) 03/24/2023 25  20 - 32 mmol/L Final     Anion Gap 03/24/2023 4  3 - 14 mmol/L Final     Urea Nitrogen 03/24/2023 13  7 - 30 mg/dL Final     Creatinine 03/24/2023 0.77  0.52 - 1.04 mg/dL Final     Calcium 03/24/2023 8.6  8.5 - 10.1 mg/dL Final     Glucose 03/24/2023 105 (H)  70 - 99 mg/dL Final     Alkaline Phosphatase 03/24/2023 107  40 - 150 U/L Final     AST 03/24/2023 24  0 - 45 U/L Final     ALT 03/24/2023 41  0 - 50 U/L Final     Protein Total 03/24/2023 7.9  6.8 -  8.8 g/dL Final     Albumin 03/24/2023 3.8  3.4 - 5.0 g/dL Final     Bilirubin Total 03/24/2023 0.6  0.2 - 1.3 mg/dL Final     GFR Estimate 03/24/2023 >90  >60 mL/min/1.73m2 Final    eGFR calculated using 2021 CKD-EPI equation.     TSH 03/24/2023 1.09  0.40 - 4.00 mU/L Final     Assessment & Plan     Class 2 severe obesity due to excess calories with serious comorbidity and body mass index (BMI) of 38.0 to 38.9 in adult (H)  Healthy diet and exercise reviewed.  Limit pop and juice intake.  Risks of obesity discussed.  Encourage exercise.  Limit TV/Computer/game time to one hour a day.  Will Start:  - Semaglutide-Weight Management (WEGOVY) 0.25 MG/0.5ML pen  Dispense: 2 mL; Refill: 0  Follow up 3 months     Prediabetes  Will Start:  - Semaglutide-Weight Management (WEGOVY) 0.25 MG/0.5ML pen  Dispense: 2 mL; Refill: 0    Hyperlipidemia LDL goal <160  Continue current medications as prescribed.   Will Start:  - Semaglutide-Weight Management (WEGOVY) 0.25 MG/0.5ML pen  Dispense: 2 mL; Refill: 0      Prescription drug management   Time spent by me doing chart review, history and exam, documentation and further activities per the note       See Patient Instructions  Patient Instructions     PLAN:   1.   Symptomatic therapy suggested: will start on Wegovy once a week.  2.  Orders Placed This Encounter   Medications     Semaglutide-Weight Management (WEGOVY) 0.25 MG/0.5ML pen     Sig: Inject 0.25 mg Subcutaneous once a week     Dispense:  2 mL     Refill:  0     3. Patient needs to follow up in if no improvement,or sooner if worsening of symptoms or other symptoms develop.  Follow up in 3 months.          [facility]          Video-Visit Details    Type of service:  Video Visit     Originating Location (pt. Location): Home    Distant Location (provider location):  On-site  Platform used for Video Visit: Mike

## 2024-01-21 ENCOUNTER — E-VISIT (OUTPATIENT)
Dept: FAMILY MEDICINE | Facility: CLINIC | Age: 49
End: 2024-01-21
Payer: COMMERCIAL

## 2024-01-21 DIAGNOSIS — J06.9 UPPER RESPIRATORY TRACT INFECTION, UNSPECIFIED TYPE: Primary | ICD-10-CM

## 2024-01-21 PROCEDURE — 99207 PR NON-BILLABLE SERV PER CHARTING: CPT | Performed by: NURSE PRACTITIONER

## 2024-01-22 ENCOUNTER — NURSE TRIAGE (OUTPATIENT)
Dept: FAMILY MEDICINE | Facility: CLINIC | Age: 49
End: 2024-01-22
Payer: COMMERCIAL

## 2024-01-22 NOTE — TELEPHONE ENCOUNTER
Provider E-Visit time total (minutes): 5  Please call patient and triage and if able please put her in same day slot with me   Dona Hannah, HERMELINDA, APRN CNP

## 2024-01-22 NOTE — TELEPHONE ENCOUNTER
"Nurse Triage SBAR    Is this a 2nd Level Triage? NO    Situation: Patient having ongoing symptoms for the past 3 weeks.     Background: Symptoms initially started on 1/2/2024. See E-visit notes for further details of symptoms.     Assessment: Patient reports that the SOB is more congestion related, not present at rest or when sitting. Patient denies any chest pain, dizziness, palpitations.    Protocol Recommended Disposition:   See in Office Within 3 Days    Recommendation: Patient scheduled for office visit with PCP 1/23/2024. Red flag symptoms to be seen sooner in UC/ED. Patient in agreement with plan. No further questions or concerns.    MYRA [facility] Primary Care Triage       Reason for Disposition   Cough has been present for > 3 weeks    Answer Assessment - Initial Assessment Questions  1. ONSET: \"When did the cough begin?\"       3 weeks ago  2. SEVERITY: \"How bad is the cough today?\"       Symptoms got worse the past week  3. SPUTUM: \"Describe the color of your sputum\" (none, dry cough; clear, white, yellow, green)      See e-visit notes  4. HEMOPTYSIS: \"Are you coughing up any blood?\" If so ask: \"How much?\" (flecks, streaks, tablespoons, etc.)      No  5. DIFFICULTY BREATHING: \"Are you having difficulty breathing?\" If Yes, ask: \"How bad is it?\" (e.g., mild, moderate, severe)     - MILD: No SOB at rest, mild SOB with walking, speaks normally in sentences, can lie down, no retractions, pulse < 100.     - MODERATE: SOB at rest, SOB with minimal exertion and prefers to sit, cannot lie down flat, speaks in phrases, mild retractions, audible wheezing, pulse 100-120.     - SEVERE: Very SOB at rest, speaks in single words, struggling to breathe, sitting hunched forward, retractions, pulse > 120       Mild SOB, no present with rest/sitting, patient feels it's more congestion related  6. FEVER: \"Do you have a fever?\" If Yes, ask: \"What is your temperature, how was it measured, and when did it " "start?\"      See e-visit notes  7. CARDIAC HISTORY: \"Do you have any history of heart disease?\" (e.g., heart attack, congestive heart failure)       See problem list  8. LUNG HISTORY: \"Do you have any history of lung disease?\"  (e.g., pulmonary embolus, asthma, emphysema)      See problem list  9. PE RISK FACTORS: \"Do you have a history of blood clots?\" (or: recent major surgery, recent prolonged travel, bedridden)      No  10. OTHER SYMPTOMS: \"Do you have any other symptoms?\" (e.g., runny nose, wheezing, chest pain)        Congestion  11. PREGNANCY: \"Is there any chance you are pregnant?\" \"When was your last menstrual period?\"        No  12. TRAVEL: \"Have you traveled out of the country in the last month?\" (e.g., travel history, exposures)        Did not ask    Protocols used: Cough-A-OH    "

## 2024-01-30 ENCOUNTER — IMMUNIZATION (OUTPATIENT)
Dept: FAMILY MEDICINE | Facility: CLINIC | Age: 49
End: 2024-01-30
Payer: COMMERCIAL

## 2024-01-30 DIAGNOSIS — Z23 NEED FOR PROPHYLACTIC VACCINATION AND INOCULATION AGAINST INFLUENZA: Primary | ICD-10-CM

## 2024-01-30 PROCEDURE — 90686 IIV4 VACC NO PRSV 0.5 ML IM: CPT

## 2024-01-30 PROCEDURE — 90480 ADMN SARSCOV2 VAC 1/ONLY CMP: CPT

## 2024-01-30 PROCEDURE — 99207 PR NO CHARGE NURSE ONLY: CPT

## 2024-01-30 PROCEDURE — 90471 IMMUNIZATION ADMIN: CPT

## 2024-01-30 PROCEDURE — 91320 SARSCV2 VAC 30MCG TRS-SUC IM: CPT

## 2024-02-22 ENCOUNTER — E-VISIT (OUTPATIENT)
Dept: FAMILY MEDICINE | Facility: CLINIC | Age: 49
End: 2024-02-22
Payer: COMMERCIAL

## 2024-02-22 DIAGNOSIS — A09 TRAVELER'S DIARRHEA: Primary | ICD-10-CM

## 2024-02-22 PROCEDURE — 99421 OL DIG E/M SVC 5-10 MIN: CPT | Performed by: NURSE PRACTITIONER

## 2024-02-22 RX ORDER — AZITHROMYCIN 500 MG/1
500 TABLET, FILM COATED ORAL DAILY
Qty: 3 TABLET | Refills: 0 | Status: SHIPPED | OUTPATIENT
Start: 2024-02-22 | End: 2024-02-25

## 2024-05-09 ENCOUNTER — ANCILLARY PROCEDURE (OUTPATIENT)
Dept: MAMMOGRAPHY | Facility: CLINIC | Age: 49
End: 2024-05-09
Attending: NURSE PRACTITIONER
Payer: COMMERCIAL

## 2024-05-09 DIAGNOSIS — Z12.31 VISIT FOR SCREENING MAMMOGRAM: ICD-10-CM

## 2024-05-09 PROCEDURE — 77067 SCR MAMMO BI INCL CAD: CPT | Mod: TC | Performed by: RADIOLOGY

## 2024-06-18 ENCOUNTER — OFFICE VISIT (OUTPATIENT)
Dept: FAMILY MEDICINE | Facility: CLINIC | Age: 49
End: 2024-06-18
Payer: COMMERCIAL

## 2024-06-18 VITALS
RESPIRATION RATE: 20 BRPM | WEIGHT: 245 LBS | DIASTOLIC BLOOD PRESSURE: 70 MMHG | BODY MASS INDEX: 39.37 KG/M2 | HEIGHT: 66 IN | TEMPERATURE: 99.6 F | HEART RATE: 87 BPM | OXYGEN SATURATION: 97 % | SYSTOLIC BLOOD PRESSURE: 119 MMHG

## 2024-06-18 DIAGNOSIS — Z13.6 ENCOUNTER FOR SCREENING FOR CORONARY ARTERY DISEASE: ICD-10-CM

## 2024-06-18 DIAGNOSIS — Z00.00 ROUTINE GENERAL MEDICAL EXAMINATION AT A HEALTH CARE FACILITY: Primary | ICD-10-CM

## 2024-06-18 DIAGNOSIS — E66.812 CLASS 2 SEVERE OBESITY DUE TO EXCESS CALORIES WITH SERIOUS COMORBIDITY AND BODY MASS INDEX (BMI) OF 39.0 TO 39.9 IN ADULT (H): ICD-10-CM

## 2024-06-18 DIAGNOSIS — E66.01 CLASS 2 SEVERE OBESITY DUE TO EXCESS CALORIES WITH SERIOUS COMORBIDITY AND BODY MASS INDEX (BMI) OF 39.0 TO 39.9 IN ADULT (H): ICD-10-CM

## 2024-06-18 DIAGNOSIS — Z13.0 SCREENING FOR DISORDER OF BLOOD AND BLOOD-FORMING ORGANS: ICD-10-CM

## 2024-06-18 DIAGNOSIS — Z13.29 SCREENING FOR THYROID DISORDER: ICD-10-CM

## 2024-06-18 DIAGNOSIS — Z13.1 SCREENING FOR DIABETES MELLITUS (DM): ICD-10-CM

## 2024-06-18 DIAGNOSIS — Z13.6 CARDIOVASCULAR SCREENING; LDL GOAL LESS THAN 160: ICD-10-CM

## 2024-06-18 DIAGNOSIS — Z82.49 FAMILY HISTORY OF ISCHEMIC HEART DISEASE: ICD-10-CM

## 2024-06-18 DIAGNOSIS — R73.03 PREDIABETES: ICD-10-CM

## 2024-06-18 DIAGNOSIS — G93.89 CEREBRAL VENTRICULOMEGALY: ICD-10-CM

## 2024-06-18 DIAGNOSIS — G91.9 HYDROCEPHALUS IN ADULT (H): ICD-10-CM

## 2024-06-18 DIAGNOSIS — D22.9 NUMEROUS SKIN MOLES: ICD-10-CM

## 2024-06-18 DIAGNOSIS — E78.5 HYPERLIPIDEMIA LDL GOAL <160: ICD-10-CM

## 2024-06-18 PROCEDURE — 99396 PREV VISIT EST AGE 40-64: CPT | Performed by: NURSE PRACTITIONER

## 2024-06-18 SDOH — HEALTH STABILITY: PHYSICAL HEALTH: ON AVERAGE, HOW MANY DAYS PER WEEK DO YOU ENGAGE IN MODERATE TO STRENUOUS EXERCISE (LIKE A BRISK WALK)?: 1 DAY

## 2024-06-18 SDOH — HEALTH STABILITY: PHYSICAL HEALTH: ON AVERAGE, HOW MANY MINUTES DO YOU ENGAGE IN EXERCISE AT THIS LEVEL?: 10 MIN

## 2024-06-18 ASSESSMENT — SOCIAL DETERMINANTS OF HEALTH (SDOH): HOW OFTEN DO YOU GET TOGETHER WITH FRIENDS OR RELATIVES?: THREE TIMES A WEEK

## 2024-06-18 ASSESSMENT — PAIN SCALES - GENERAL: PAINLEVEL: NO PAIN (0)

## 2024-06-18 NOTE — PATIENT INSTRUCTIONS
"  PLAN:   1.   Symptomatic therapy suggested: will try prescribe Wegovy again  Increase calcium to 1000mg and 1000iu Vit D .  2.  Orders Placed This Encounter   Medications    Semaglutide-Weight Management (WEGOVY) 0.25 MG/0.5ML pen     Sig: Inject 0.25 mg Subcutaneous once a week     Dispense:  2 mL     Refill:  0     Orders Placed This Encounter   Procedures    REVIEW OF HEALTH MAINTENANCE PROTOCOL ORDERS    Lipid panel reflex to direct LDL Fasting    CBC with platelets    Comprehensive metabolic panel    TSH with free T4 reflex    Hemoglobin A1c    Adult Dermatology  Referral       3.  FUTURE LABS:       - Schedule a fasting blood draw   Will follow up and/or notify patient of  results via My Chart to determine further need for followup   Work on weight loss  Regular exercise  Follow up in 4 months after being on Wegovy for 3 whole months   Follow up office visit in one year for annual health maintenance exam, sooner PRN.   Patient needs to follow up in if no improvement,or sooner if worsening of symptoms or other symptoms develop.            Patient Education   Preventive Care Advice   This is general advice we often give to help people stay healthy. Your care team may have specific advice just for you. Please talk to your care team about your own preventive care needs.  Lifestyle  Exercise at least 150 minutes each week (30 minutes a day, 5 days a week).  Do muscle strengthening activities 2 days a week. These help control your weight and prevent disease.  No smoking.  Wear sunscreen to prevent skin cancer.  Have your home tested for radon every 2 to 5 years. Radon is a colorless, odorless gas that can harm your lungs. To learn more, go to www.health.[facility].[place].us and search for \"Radon in Homes.\"  Keep guns unloaded and locked up in a safe place like a safe or gun vault, or, use a gun lock and hide the keys. Always lock away bullets separately. To learn more, visit [employer].[place].gov and search for \"safe gun " "storage.\"  Nutrition  Eat 5 or more servings of fruits and vegetables each day.  Try wheat bread, brown rice and whole grain pasta (instead of white bread, rice, and pasta).  Get enough calcium and vitamin D. Check the label on foods and aim for 100% of the RDA (recommended daily allowance).  Regular exams  Have a dental exam and cleaning every 6 months.  See your health care team every year to talk about:  Any changes in your health.  Any medicines your care team has prescribed.  Preventive care, family planning, and ways to prevent chronic diseases.  Shots (vaccines)   HPV shots (up to age 26), if you've never had them before.  Hepatitis B shots (up to age 59), if you've never had them before.  COVID-19 shot: Get this shot when it's due.  Flu shot: Get a flu shot every year.  Tetanus shot: Get a tetanus shot every 10 years.  Pneumococcal, hepatitis A, and RSV shots: Ask your care team if you need these based on your risk.  Shingles shot (for age 50 and up).  General health tests  Diabetes screening:  Starting at age 35, Get screened for diabetes at least every 3 years.  If you are younger than age 35, ask your care team if you should be screened for diabetes.  Cholesterol test: At age 39, start having a cholesterol test every 5 years, or more often if advised.  Bone density scan (DEXA): At age 50, ask your care team if you should have this scan for osteoporosis (brittle bones).  Hepatitis C: Get tested at least once in your life.  Abdominal aortic aneurysm screening: Talk to your doctor about having this screening if you:  Have ever smoked; and  Are biologically male; and  Are between the ages of 65 and 75.  STIs (sexually transmitted infections)  Before age 24: Ask your care team if you should be screened for STIs.  After age 24: Get screened for STIs if you're at risk. You are at risk for STIs (including HIV) if:  You are sexually active with more than one person.  You don't use condoms every time.  You or a " partner was diagnosed with a sexually transmitted infection.  If you are at risk for HIV, ask about PrEP medicine to prevent HIV.  Get tested for HIV at least once in your life, whether you are at risk for HIV or not.  Cancer screening tests  Cervical cancer screening: If you have a cervix, begin getting regular cervical cancer screening tests at age 21. Most people who have regular screenings with normal results can stop after age 65. Talk about this with your provider.  Breast cancer scan (mammogram): If you've ever had breasts, begin having regular mammograms starting at age 40. This is a scan to check for breast cancer.  Colon cancer screening: It is important to start screening for colon cancer at age 45.  Have a colonoscopy test every 10 years (or more often if you're at risk) Or, ask your provider about stool tests like a FIT test every year or Cologuard test every 3 years.  To learn more about your testing options, visit: www.[employer]/116946.pdf.  For help making a decision, visit: sergio/dv78305.  Prostate cancer screening test: If you have a prostate and are age 55 to 69, ask your provider if you would benefit from a yearly prostate cancer screening test.  Lung cancer screening: If you are a current or former smoker age 50 to 80, ask your care team if ongoing lung cancer screenings are right for you.  For informational purposes only. Not to replace the advice of your health care provider. Copyright   2023 [employer]. All rights reserved. Clinically reviewed by the [facility] Transitions Program. [employer] 160402 - REV 04/24.

## 2024-06-18 NOTE — PROGRESS NOTES
Preventive Care Visit  [facility]  Dona LIV Higgins CNP, Internal Medicine - Pediatrics  Jun 18, 2024      Assessment & Plan     Routine general medical examination at a health care facility  - REVIEW OF HEALTH MAINTENANCE PROTOCOL ORDERS    CARDIOVASCULAR SCREENING; LDL GOAL LESS THAN 160  - Lipid panel reflex to direct LDL Fasting    Screening for diabetes mellitus (DM)  - Comprehensive metabolic panel  - Hemoglobin A1c    Screening for disorder of blood and blood-forming organs  - CBC with platelets    Screening for thyroid disorder  - TSH with free T4 reflex    Class 2 severe obesity due to excess calories with serious comorbidity and body mass index (BMI) of 39.0 to 39.9 in adult (H)  Healthy diet and exercise reviewed.  Limit pop and juice intake.  Risks of obesity discussed.  Encourage exercise.  Limit TV/Computer/game time to one hour a day.  Will Start:  - Semaglutide-Weight Management (WEGOVY) 0.25 MG/0.5ML pen  Dispense: 2 mL; Refill: 0    Prediabetes  Will Start: Wegovy  -Glucose is slight elevated and may be a sign of early diabetes (prediabetes). ADVISE:: eating a low carbohydrate diet, exercising, trying to lose weight (if necessary) and rechecking your glucose level in 12 months.    - Hemoglobin A1c  - Semaglutide-Weight Management (WEGOVY) 0.25 MG/0.5ML pen  Dispense: 2 mL; Refill: 0    Hydrocephalus in adult (H)  Stable with no symptoms     Cerebral ventriculomegaly  Stable with no symptoms after evaluation last year     Encounter for screening for coronary artery disease  - CT Coronary Calcium Scan    Family history of ischemic heart disease  - CT Coronary Calcium Scan    Hyperlipidemia LDL goal <160  - CT Coronary Calcium Scan    Numerous skin moles  - Adult Dermatology  Referral    Patient has been advised of split billing requirements and indicates understanding: Yes    Nicotine/Tobacco Cessation  She reports that she has been smoking cigarettes. She  "started smoking about 32 years ago. She has a 16 pack-year smoking history. She has never used smokeless tobacco.  Nicotine/Tobacco Cessation Plan  Information offered: Patient not interested at this time      BMI  Estimated body mass index is 39.54 kg/m  as calculated from the following:    Height as of this encounter: 1.676 m (5' 6\").    Weight as of this encounter: 111.1 kg (245 lb).   Weight management plan: Discussed healthy diet and exercise guidelines    Counseling  Appropriate preventive services were discussed with this patient, including applicable screening as appropriate for fall prevention, nutrition, physical activity, Tobacco-use cessation, weight loss and cognition.  Checklist reviewing preventive services available has been given to the patient.  Reviewed patient's diet, addressing concerns and/or questions.   She is at risk for lack of exercise and has been provided with information to increase physical activity for the benefit of her well-being.       FUTURE APPOINTMENTS:       - Follow-up for annual visit or as needed  See Patient Instructions    Dahiana Lundberg is a 49 year old, presenting for the following:  Physical        6/18/2024    10:13 AM   Additional Questions   Roomed by Vickie REED   Accompanied by self        Health Care Directive  Patient does not have a Health Care Directive or Living Will: Discussed advance care planning with patient; however, patient declined at this time.    Healthy Habits:     Getting at least 3 servings of Calcium per day:  Yes    Bi-annual eye exam:  Yes    Dental care twice a year:  Yes    Sleep apnea or symptoms of sleep apnea:  Excessive snoring    Diet:  Regular (no restrictions)    Frequency of exercise:  None    Duration of exercise:  N/A    Taking medications regularly:  Yes    Barriers to taking medications:  None    Medication side effects:  None    Additional concerns today:  No          6/18/2024   General Health   How would you rate your overall " physical health? (!) FAIR   Feel stress (tense, anxious, or unable to sleep) Not at all         6/18/2024   Nutrition   Three or more servings of calcium each day? Yes   Diet: Regular (no restrictions)   How many servings of fruit and vegetables per day? (!) 2-3   How many sweetened beverages each day? 0-1         6/18/2024   Exercise   Days per week of moderate/strenous exercise 1 day   Average minutes spent exercising at this level 10 min   (!) EXERCISE CONCERN      6/18/2024   Social Factors   Frequency of gathering with friends or relatives Three times a week   Worry food won't last until get money to buy more No   Food not last or not have enough money for food? No   Do you have housing?  Yes   Are you worried about losing your housing? No   Lack of transportation? No   Unable to get utilities (heat,electricity)? No         6/18/2024   Dental   Dentist two times every year? Yes         6/18/2024   TB Screening   Were you born outside of the [place]? No         Today's PHQ-2 Score:       6/18/2024     8:47 AM   PHQ-2 ( 1999 [employer])   Q1: Little interest or pleasure in doing things 0   Q2: Feeling down, depressed or hopeless 0   PHQ-2 Score 0   Q1: Little interest or pleasure in doing things Not at all   Q2: Feeling down, depressed or hopeless Not at all   PHQ-2 Score 0           6/18/2024   Substance Use   Alcohol more than 3/day or more than 7/wk No   Do you use any other substances recreationally? (!) CANNABIS PRODUCTS     Social History     Tobacco Use    Smoking status: Every Day     Current packs/day: 0.50     Average packs/day: 0.5 packs/day for 32.1 years (16.0 ttl pk-yrs)     Types: Cigarettes     Start date: 6/1/1992    Smokeless tobacco: Never    Tobacco comments:     Quite Date Set for 2/14/222   Vaping Use    Vaping status: Never Used   Substance Use Topics    Alcohol use: Yes     Comment: Occ. few times a month - couple of drinks    Drug use: No           5/9/2024   LAST FHS-7 RESULTS   1st degree  relative breast or ovarian cancer No   Any relative bilateral breast cancer No   Any male have breast cancer No   Any ONE woman have BOTH breast AND ovarian cancer No   Any woman with breast cancer before 50yrs No   2 or more relatives with breast AND/OR ovarian cancer No   2 or more relatives with breast AND/OR bowel cancer No        Mammogram Screening - Mammogram every 1-2 years updated in Health Maintenance based on mutual decision making        6/18/2024   STI Screening   New sexual partner(s) since last STI/HIV test? No     History of abnormal Pap smear: No - age 30- 64 PAP with HPV every 5 years recommended        Latest Ref Rng & Units 2/8/2022     3:04 PM 11/5/2020     2:38 PM 11/5/2020     2:00 PM   PAP / HPV   PAP  Negative for Intraepithelial Lesion or Malignancy (NILM)      PAP (Historical)   NIL     HPV 16 DNA Negative Negative   Negative    HPV 18 DNA Negative Negative   Negative    Other HR HPV Negative Negative   Negative      ASCVD Risk   The 10-year ASCVD risk score (Brayden JOHN, et al., 2019) is: 6%    Values used to calculate the score:      Age: 49 years      Sex: Female      Is Non- : No      Diabetic: No      Tobacco smoker: Yes      Systolic Blood Pressure: 119 mmHg      Is BP treated: No      HDL Cholesterol: 38 mg/dL      Total Cholesterol: 241 mg/dL        6/18/2024   Contraception/Family Planning   Questions about contraception or family planning No        Reviewed and updated as needed this visit by Provider                    Past Medical History:   Diagnosis Date    Cervical high risk HPV (human papillomavirus) test positive 09/19/2019    See problem list     Past Surgical History:   Procedure Laterality Date    ZZC APPENDECTOMY  07/1995     Lab work is in process  Labs reviewed in EPIC  BP Readings from Last 3 Encounters:   06/18/24 119/70   03/24/23 133/71   06/24/22 120/70    Wt Readings from Last 3 Encounters:   06/18/24 111.1 kg (245 lb)   03/24/23  111.1 kg (244 lb 14.4 oz)   06/24/22 111.1 kg (245 lb)                  Patient Active Problem List   Diagnosis    Tobacco use disorder    Hyperlipidemia LDL goal <160    Cervical high risk HPV (human papillomavirus) test positive    Class 2 severe obesity due to excess calories with serious comorbidity and body mass index (BMI) of 38.0 to 38.9 in adult (H)    Cerebral ventriculomegaly    Hydrocephalus in adult (H)    Prediabetes     Past Surgical History:   Procedure Laterality Date    ZZC APPENDECTOMY  07/1995       Social History     Tobacco Use    Smoking status: Every Day     Current packs/day: 0.50     Average packs/day: 0.5 packs/day for 32.0 years (16.0 ttl pk-yrs)     Types: Cigarettes     Start date: 6/1/1992    Smokeless tobacco: Never    Tobacco comments:     Quite Date Set for 2/14/222   Substance Use Topics    Alcohol use: Yes     Comment: Occ. few times a month - couple of drinks     Family History   Problem Relation Age of Onset    Diabetes Father     Hypertension Father     Respiratory Maternal Grandmother         emphysema    Diabetes Maternal Grandmother     Cancer Paternal Grandmother         lung    Other Cancer Paternal Grandmother     Depression Sister          Current Outpatient Medications   Medication Sig Dispense Refill    FLUoxetine (PROZAC) 20 MG capsule TAKE 2 CAPSULES BY MOUTH EVERY DAY (Patient taking differently: Take 20 mg by mouth daily) 180 capsule 3    Semaglutide-Weight Management (WEGOVY) 0.25 MG/0.5ML pen Inject 0.25 mg Subcutaneous once a week 2 mL 0    nicotine (NICODERM CQ) 14 MG/24HR 24 hr patch PLACE 1 PATCH ONTO THE SKIN EVERY 24 HOURS. 28 patch 1     No Known Allergies    CONSTITUTIONAL:NEGATIVE for fever, chills, change in weight  INTEGUMENTARY/SKIN: NEGATIVE for non-healing lesion  and POSITIVE for increasing moles   EYES: NEGATIVE for vision changes or irritation  ENT: NEGATIVE for ear, mouth and throat problems  RESP:NEGATIVE for significant cough or SOB  BREAST:  "NEGATIVE for masses, tenderness or discharge  CV: NEGATIVE for chest pain, palpitations or peripheral edema  GI: NEGATIVE for nausea, abdominal pain, heartburn, or change in bowel habits   female: normal menses, no unusual urinary symptoms, and no unusual vaginal symptoms  MUSCULOSKELETAL:NEGATIVE for significant arthralgias or myalgia. Some mild issues with knees. No instability   Further workup and treatment could be done if symptoms persist, worsen or new related symptoms occur. The patient will call in that eventuality.  NEURO: NEGATIVE for weakness, dizziness or paresthesias  ENDOCRINE: POSITIVE  for difficulty losing weight. Could not get the Wegovy last year  and NEGATIVE for polydipsia, polyphagia, and polyuria  HEME/ALLERGY/IMMUNE: NEGATIVE for allergies and anemia  PSYCHIATRIC: NEGATIVE for changes in mood or affect . Is decreasing prozac down to 20 mg a day and doing well on that           Objective    Exam  /70 (BP Location: Right arm, Patient Position: Sitting, Cuff Size: Adult Large)   Pulse 87   Temp 99.6  F (37.6  C) (Oral)   Resp 20   Ht 1.676 m (5' 6\")   Wt 111.1 kg (245 lb)   LMP 05/28/2024 (Approximate)   SpO2 97%   BMI 39.54 kg/m     Estimated body mass index is 39.54 kg/m  as calculated from the following:    Height as of this encounter: 1.676 m (5' 6\").    Weight as of this encounter: 111.1 kg (245 lb).  Wt Readings from Last 4 Encounters:   06/18/24 111.1 kg (245 lb)   03/24/23 111.1 kg (244 lb 14.4 oz)   06/24/22 111.1 kg (245 lb)   06/24/22 111.2 kg (245 lb 1.6 oz)      Physical Exam  GENERAL: alert and no distress  EYES: Eyes grossly normal to inspection and conjunctivae and sclerae normal  HENT: ear canals and TM's normal, nose and mouth without ulcers or lesions  NECK: no adenopathy, no asymmetry, masses, or scars  RESP: lungs clear to auscultation - no rales, rhonchi or wheezes  BREAST: normal without masses, tenderness or nipple discharge and no palpable axillary masses " or adenopathy  CV: regular rates and rhythm, no murmur, click or rub, peripheral pulses strong, and no peripheral edema  ABDOMEN: soft, nontender, no hepatosplenomegaly, no masses and bowel sounds normal   (female): deferred  MS: no gross musculoskeletal defects noted, no edema  SKIN: no suspicious lesions or rashes  NEURO: Normal strength and tone, mentation intact and speech normal  PSYCH: mentation appears normal, affect normal/bright  LYMPH: no cervical, supraclavicular, axillary, or inguinal adenopathy        Signed Electronically by: LIV Heath CNP

## 2024-11-22 DIAGNOSIS — F43.23 ADJUSTMENT DISORDER WITH MIXED ANXIETY AND DEPRESSED MOOD: ICD-10-CM

## 2024-11-22 NOTE — TELEPHONE ENCOUNTER
Spoke with patient, states that she went down to 1 capsule per day. Patient reports taking fluoxetine, 20mg cap, 1 cap per day. Routing to provider, please update prescription and sign if appropriate. Darwin Stanley RN, BSN

## 2024-11-22 NOTE — TELEPHONE ENCOUNTER
Clinic RN: Please investigate patient's chart or contact patient if the information cannot be found because patient should have run out of this medication on 05/11/2024. Confirm patient is taking this medication as prescribed. Document findings and route refill encounter to provider for approval or denial.

## 2025-01-09 ENCOUNTER — LAB (OUTPATIENT)
Dept: LAB | Facility: CLINIC | Age: 50
End: 2025-01-09
Payer: COMMERCIAL

## 2025-01-09 DIAGNOSIS — Z13.6 CARDIOVASCULAR SCREENING; LDL GOAL LESS THAN 160: ICD-10-CM

## 2025-01-09 DIAGNOSIS — R73.03 PREDIABETES: ICD-10-CM

## 2025-01-09 DIAGNOSIS — Z13.1 SCREENING FOR DIABETES MELLITUS (DM): ICD-10-CM

## 2025-01-09 DIAGNOSIS — Z13.29 SCREENING FOR THYROID DISORDER: ICD-10-CM

## 2025-01-09 DIAGNOSIS — Z13.0 SCREENING FOR DISORDER OF BLOOD AND BLOOD-FORMING ORGANS: ICD-10-CM

## 2025-01-09 LAB
ALBUMIN SERPL BCG-MCNC: 4.3 G/DL (ref 3.5–5.2)
ALP SERPL-CCNC: 127 U/L (ref 40–150)
ALT SERPL W P-5'-P-CCNC: 41 U/L (ref 0–50)
ANION GAP SERPL CALCULATED.3IONS-SCNC: 10 MMOL/L (ref 7–15)
AST SERPL W P-5'-P-CCNC: 27 U/L (ref 0–45)
BILIRUB SERPL-MCNC: 0.4 MG/DL
BUN SERPL-MCNC: 15.2 MG/DL (ref 6–20)
CALCIUM SERPL-MCNC: 9.4 MG/DL (ref 8.8–10.4)
CHLORIDE SERPL-SCNC: 106 MMOL/L (ref 98–107)
CHOLEST SERPL-MCNC: 237 MG/DL
CREAT SERPL-MCNC: 0.98 MG/DL (ref 0.51–0.95)
EGFRCR SERPLBLD CKD-EPI 2021: 71 ML/MIN/1.73M2
ERYTHROCYTE [DISTWIDTH] IN BLOOD BY AUTOMATED COUNT: 12.8 % (ref 10–15)
EST. AVERAGE GLUCOSE BLD GHB EST-MCNC: 114 MG/DL
FASTING STATUS PATIENT QL REPORTED: YES
FASTING STATUS PATIENT QL REPORTED: YES
GLUCOSE SERPL-MCNC: 104 MG/DL (ref 70–99)
HBA1C MFR BLD: 5.6 % (ref 0–5.6)
HCO3 SERPL-SCNC: 24 MMOL/L (ref 22–29)
HCT VFR BLD AUTO: 42.5 % (ref 35–47)
HDLC SERPL-MCNC: 36 MG/DL
HGB BLD-MCNC: 14.1 G/DL (ref 11.7–15.7)
LDLC SERPL CALC-MCNC: 152 MG/DL
MCH RBC QN AUTO: 28.4 PG (ref 26.5–33)
MCHC RBC AUTO-ENTMCNC: 33.2 G/DL (ref 31.5–36.5)
MCV RBC AUTO: 86 FL (ref 78–100)
NONHDLC SERPL-MCNC: 201 MG/DL
PLATELET # BLD AUTO: 336 10E3/UL (ref 150–450)
POTASSIUM SERPL-SCNC: 4.5 MMOL/L (ref 3.4–5.3)
PROT SERPL-MCNC: 7.6 G/DL (ref 6.4–8.3)
RBC # BLD AUTO: 4.96 10E6/UL (ref 3.8–5.2)
SODIUM SERPL-SCNC: 140 MMOL/L (ref 135–145)
TRIGL SERPL-MCNC: 246 MG/DL
TSH SERPL DL<=0.005 MIU/L-ACNC: 1.9 UIU/ML (ref 0.3–4.2)
WBC # BLD AUTO: 10.5 10E3/UL (ref 4–11)

## 2025-01-09 NOTE — RESULT ENCOUNTER NOTE
Kael Chapin,    Attached are your test results.  -Normal red blood cell (hgb) levels, normal white blood cell count and normal platelet levels.  -A1C (diabetic test) is normal and indicates that your blood sugar has been in a normal range the last 3 months.   Please contact us if you have any questions.    Dona Hannah, CNP

## 2025-01-10 NOTE — RESULT ENCOUNTER NOTE
Kael Chapin,    Attached are your test results.  -LDL(bad) cholesterol level is elevated, HDL(good) cholesterol level is low and your triglycerides are elevated which can increase your heart disease risk.  A diet high in fat and simple carbohydrates, genetics and being overweight can contribute to this. ADVISE: exercising 150 minutes of aerobic exercise per week (30 minutes for 5 days per week or 50 minutes for 3 days per week are options), eating a low saturated fat/low carbohydrate diet, and omega-3 fatty acids (fish oil)  daily are helpful to improve this. In 12 months, you should recheck your fasting cholesterol panel by scheduling a lab-only appointment.  -Liver and gallbladder tests (ALT,AST, Alk phos,bilirubin) are normal.  -Kidney function (GFR) is normal.  -Sodium is normal.  -Potassium is normal.  -Calcium is normal.  -Glucose is slight elevated and may be a sign of early diabetes (prediabetes). ADVISE:: eating a low carbohydrate diet, exercising, trying to lose weight (if necessary) and rechecking your glucose level in 12 months.  -TSH (thyroid stimulating hormone) level is normal which indicates normal thyroid function.   Please contact us if you have any questions.    Dona Hannah, CNP

## 2025-02-05 ENCOUNTER — PATIENT OUTREACH (OUTPATIENT)
Dept: CARE COORDINATION | Facility: CLINIC | Age: 50
End: 2025-02-05
Payer: COMMERCIAL

## 2025-02-06 DIAGNOSIS — Z12.11 COLON CANCER SCREENING: ICD-10-CM

## 2025-02-19 ENCOUNTER — ORDERS ONLY (AUTO-RELEASED) (OUTPATIENT)
Dept: URGENT CARE | Facility: CLINIC | Age: 50
End: 2025-02-19
Payer: COMMERCIAL

## 2025-02-19 DIAGNOSIS — Z12.11 COLON CANCER SCREENING: ICD-10-CM

## 2025-04-07 DIAGNOSIS — R73.03 PREDIABETES: ICD-10-CM

## 2025-04-07 DIAGNOSIS — E66.01 CLASS 2 SEVERE OBESITY DUE TO EXCESS CALORIES WITH SERIOUS COMORBIDITY AND BODY MASS INDEX (BMI) OF 39.0 TO 39.9 IN ADULT (H): ICD-10-CM

## 2025-04-07 DIAGNOSIS — E66.812 CLASS 2 SEVERE OBESITY DUE TO EXCESS CALORIES WITH SERIOUS COMORBIDITY AND BODY MASS INDEX (BMI) OF 39.0 TO 39.9 IN ADULT (H): ICD-10-CM

## 2025-04-07 RX ORDER — SEMAGLUTIDE 0.5 MG/.5ML
INJECTION, SOLUTION SUBCUTANEOUS
Qty: 2 ML | Refills: 0 | Status: SHIPPED | OUTPATIENT
Start: 2025-04-07

## 2025-04-07 NOTE — TELEPHONE ENCOUNTER
Appointments in Next Year      Jun 20, 2025 11:30 AM  (Arrive by 11:10 AM)  Preventative Adult Visit with LIV Heath CNP  [facility] ([facility] ) [phone]

## 2025-05-11 DIAGNOSIS — E66.812 CLASS 2 SEVERE OBESITY DUE TO EXCESS CALORIES WITH SERIOUS COMORBIDITY AND BODY MASS INDEX (BMI) OF 39.0 TO 39.9 IN ADULT (H): ICD-10-CM

## 2025-05-11 DIAGNOSIS — R73.03 PREDIABETES: ICD-10-CM

## 2025-05-11 DIAGNOSIS — E66.01 CLASS 2 SEVERE OBESITY DUE TO EXCESS CALORIES WITH SERIOUS COMORBIDITY AND BODY MASS INDEX (BMI) OF 39.0 TO 39.9 IN ADULT (H): ICD-10-CM

## 2025-05-11 RX ORDER — SEMAGLUTIDE 0.5 MG/.5ML
INJECTION, SOLUTION SUBCUTANEOUS
Qty: 2 ML | Refills: 0 | Status: SHIPPED | OUTPATIENT
Start: 2025-05-11

## 2025-05-12 NOTE — TELEPHONE ENCOUNTER
Appointments in Next Year      Jun 20, 2025 11:30 AM  (Arrive by 11:10 AM)  Preventative Adult Visit with LIV Heath CNP  [facility] ([facility] [place] ) [phone]

## 2025-06-20 ENCOUNTER — OFFICE VISIT (OUTPATIENT)
Dept: FAMILY MEDICINE | Facility: CLINIC | Age: 50
End: 2025-06-20
Attending: NURSE PRACTITIONER
Payer: COMMERCIAL

## 2025-06-20 VITALS
RESPIRATION RATE: 16 BRPM | SYSTOLIC BLOOD PRESSURE: 114 MMHG | HEIGHT: 67 IN | BODY MASS INDEX: 36.96 KG/M2 | WEIGHT: 235.5 LBS | DIASTOLIC BLOOD PRESSURE: 82 MMHG | TEMPERATURE: 97.5 F | HEART RATE: 89 BPM | OXYGEN SATURATION: 98 %

## 2025-06-20 DIAGNOSIS — Z23 ENCOUNTER FOR ADMINISTRATION OF VACCINE: ICD-10-CM

## 2025-06-20 DIAGNOSIS — R06.83 SNORING: ICD-10-CM

## 2025-06-20 DIAGNOSIS — G91.9 HYDROCEPHALUS IN ADULT (H): ICD-10-CM

## 2025-06-20 DIAGNOSIS — Z13.29 SCREENING FOR THYROID DISORDER: ICD-10-CM

## 2025-06-20 DIAGNOSIS — Z12.31 ENCOUNTER FOR SCREENING MAMMOGRAM FOR BREAST CANCER: ICD-10-CM

## 2025-06-20 DIAGNOSIS — Z13.6 CARDIOVASCULAR SCREENING; LDL GOAL LESS THAN 160: ICD-10-CM

## 2025-06-20 DIAGNOSIS — F43.23 ADJUSTMENT DISORDER WITH MIXED ANXIETY AND DEPRESSED MOOD: ICD-10-CM

## 2025-06-20 DIAGNOSIS — Z13.0 SCREENING FOR DISORDER OF BLOOD AND BLOOD-FORMING ORGANS: ICD-10-CM

## 2025-06-20 DIAGNOSIS — Z13.1 SCREENING FOR DIABETES MELLITUS (DM): ICD-10-CM

## 2025-06-20 DIAGNOSIS — Z12.4 CERVICAL CANCER SCREENING: ICD-10-CM

## 2025-06-20 DIAGNOSIS — E66.812 CLASS 2 SEVERE OBESITY DUE TO EXCESS CALORIES WITH SERIOUS COMORBIDITY AND BODY MASS INDEX (BMI) OF 39.0 TO 39.9 IN ADULT (H): ICD-10-CM

## 2025-06-20 DIAGNOSIS — Z00.00 ROUTINE GENERAL MEDICAL EXAMINATION AT A HEALTH CARE FACILITY: Primary | ICD-10-CM

## 2025-06-20 DIAGNOSIS — E66.01 CLASS 2 SEVERE OBESITY DUE TO EXCESS CALORIES WITH SERIOUS COMORBIDITY AND BODY MASS INDEX (BMI) OF 39.0 TO 39.9 IN ADULT (H): ICD-10-CM

## 2025-06-20 LAB
ALBUMIN SERPL BCG-MCNC: 4.4 G/DL (ref 3.5–5.2)
ALP SERPL-CCNC: 126 U/L (ref 40–150)
ALT SERPL W P-5'-P-CCNC: 38 U/L (ref 0–50)
ANION GAP SERPL CALCULATED.3IONS-SCNC: 11 MMOL/L (ref 7–15)
AST SERPL W P-5'-P-CCNC: 33 U/L (ref 0–45)
BILIRUB SERPL-MCNC: 0.4 MG/DL
BUN SERPL-MCNC: 15.9 MG/DL (ref 6–20)
CALCIUM SERPL-MCNC: 9.6 MG/DL (ref 8.8–10.4)
CHLORIDE SERPL-SCNC: 105 MMOL/L (ref 98–107)
CHOLEST SERPL-MCNC: 217 MG/DL
CREAT SERPL-MCNC: 0.94 MG/DL (ref 0.51–0.95)
EGFRCR SERPLBLD CKD-EPI 2021: 74 ML/MIN/1.73M2
ERYTHROCYTE [DISTWIDTH] IN BLOOD BY AUTOMATED COUNT: 13.1 % (ref 10–15)
EST. AVERAGE GLUCOSE BLD GHB EST-MCNC: 114 MG/DL
FASTING STATUS PATIENT QL REPORTED: ABNORMAL
FASTING STATUS PATIENT QL REPORTED: NORMAL
GLUCOSE SERPL-MCNC: 96 MG/DL (ref 70–99)
HBA1C MFR BLD: 5.6 % (ref 0–5.6)
HCO3 SERPL-SCNC: 23 MMOL/L (ref 22–29)
HCT VFR BLD AUTO: 40.8 % (ref 35–47)
HDLC SERPL-MCNC: 38 MG/DL
HGB BLD-MCNC: 13.4 G/DL (ref 11.7–15.7)
LDLC SERPL CALC-MCNC: 156 MG/DL
MCH RBC QN AUTO: 28 PG (ref 26.5–33)
MCHC RBC AUTO-ENTMCNC: 32.8 G/DL (ref 31.5–36.5)
MCV RBC AUTO: 85 FL (ref 78–100)
NONHDLC SERPL-MCNC: 179 MG/DL
PLATELET # BLD AUTO: 376 10E3/UL (ref 150–450)
POTASSIUM SERPL-SCNC: 4.8 MMOL/L (ref 3.4–5.3)
PROT SERPL-MCNC: 7.5 G/DL (ref 6.4–8.3)
RBC # BLD AUTO: 4.78 10E6/UL (ref 3.8–5.2)
SODIUM SERPL-SCNC: 139 MMOL/L (ref 135–145)
TRIGL SERPL-MCNC: 117 MG/DL
TSH SERPL DL<=0.005 MIU/L-ACNC: 0.63 UIU/ML (ref 0.3–4.2)
WBC # BLD AUTO: 12.2 10E3/UL (ref 4–11)

## 2025-06-20 PROCEDURE — 80061 LIPID PANEL: CPT | Performed by: NURSE PRACTITIONER

## 2025-06-20 PROCEDURE — 85027 COMPLETE CBC AUTOMATED: CPT | Performed by: NURSE PRACTITIONER

## 2025-06-20 PROCEDURE — 36415 COLL VENOUS BLD VENIPUNCTURE: CPT | Performed by: NURSE PRACTITIONER

## 2025-06-20 PROCEDURE — 80053 COMPREHEN METABOLIC PANEL: CPT | Performed by: NURSE PRACTITIONER

## 2025-06-20 PROCEDURE — G0145 SCR C/V CYTO,THINLAYER,RESCR: HCPCS | Performed by: NURSE PRACTITIONER

## 2025-06-20 PROCEDURE — 84443 ASSAY THYROID STIM HORMONE: CPT | Performed by: NURSE PRACTITIONER

## 2025-06-20 PROCEDURE — 87624 HPV HI-RISK TYP POOLED RSLT: CPT | Performed by: NURSE PRACTITIONER

## 2025-06-20 PROCEDURE — 83036 HEMOGLOBIN GLYCOSYLATED A1C: CPT | Performed by: NURSE PRACTITIONER

## 2025-06-20 SDOH — HEALTH STABILITY: PHYSICAL HEALTH: ON AVERAGE, HOW MANY DAYS PER WEEK DO YOU ENGAGE IN MODERATE TO STRENUOUS EXERCISE (LIKE A BRISK WALK)?: 3 DAYS

## 2025-06-20 SDOH — HEALTH STABILITY: PHYSICAL HEALTH: ON AVERAGE, HOW MANY MINUTES DO YOU ENGAGE IN EXERCISE AT THIS LEVEL?: 10 MIN

## 2025-06-20 ASSESSMENT — PAIN SCALES - GENERAL: PAINLEVEL_OUTOF10: NO PAIN (0)

## 2025-06-20 ASSESSMENT — SOCIAL DETERMINANTS OF HEALTH (SDOH): HOW OFTEN DO YOU GET TOGETHER WITH FRIENDS OR RELATIVES?: MORE THAN THREE TIMES A WEEK

## 2025-06-20 NOTE — PATIENT INSTRUCTIONS
PLAN:   1.   Symptomatic therapy suggested: Increase calcium to 1000mg and 1000 international unit(s) Vit D daily .  Will increase wegovy to 1 mg and will let me know when ready to increase from there,  Continue current medications as prescribed.   2.  Orders Placed This Encounter   Medications    FLUoxetine (PROZAC) 20 MG capsule     Sig: Take 2 capsules (40 mg) by mouth daily.     Dispense:  180 capsule     Refill:  3     Orders Placed This Encounter   Procedures    REVIEW OF HEALTH MAINTENANCE PROTOCOL ORDERS    MA Screen Bilateral w/Ankush    Lipid panel reflex to direct LDL Fasting    CBC with platelets    Comprehensive metabolic panel    TSH with free T4 reflex    Hemoglobin A1c    HPV and Gynecologic Cytology Panel - Recommended Age 30-65 Years    Adult Sleep Eval & Management  Referral       3.  FURTHER TESTING:       - mammogram  CONSULTATION/REFERRAL to Sleep center  Work on weight loss  Regular exercise  Will follow up and/or notify patient of  results via My Chart to determine further need for followup   Follow up office visit in one year for annual health maintenance exam, sooner PRN.   Patient needs to follow up in if no improvement,or sooner if worsening of symptoms or other symptoms develop.            Patient Education   Preventive Care Advice   This is general advice given by our system to help you stay healthy. However, your care team may have specific advice just for you. Please talk to your care team about your preventive care needs.  Nutrition  Eat 5 or more servings of fruits and vegetables each day.  Try wheat bread, brown rice and whole grain pasta (instead of white bread, rice, and pasta).  Get enough calcium and vitamin D. Check the label on foods and aim for 100% of the RDA (recommended daily allowance).  Lifestyle  Exercise at least 150 minutes each week  (30 minutes a day, 5 days a week).  Do muscle strengthening activities 2 days a week. These help control your weight and  prevent disease.  No smoking.  Wear sunscreen to prevent skin cancer.  Have a dental exam and cleaning every 6 months.  Yearly exams  See your health care team every year to talk about:  Any changes in your health.  Any medicines your care team has prescribed.  Preventive care, family planning, and ways to prevent chronic diseases.  Shots (vaccines)   HPV shots (up to age 26), if you've never had them before.  Hepatitis B shots (up to age 59), if you've never had them before.  COVID-19 shot: Get this shot when it's due.  Flu shot: Get a flu shot every year.  Tetanus shot: Get a tetanus shot every 10 years.  Pneumococcal, hepatitis A, and RSV shots: Ask your care team if you need these based on your risk.  Shingles shot (for age 50 and up)  General health tests  Diabetes screening:  Starting at age 35, Get screened for diabetes at least every 3 years.  If you are younger than age 35, ask your care team if you should be screened for diabetes.  Cholesterol test: At age 39, start having a cholesterol test every 5 years, or more often if advised.  Bone density scan (DEXA): At age 50, ask your care team if you should have this scan for osteoporosis (brittle bones).  Hepatitis C: Get tested at least once in your life.  STIs (sexually transmitted infections)  Before age 24: Ask your care team if you should be screened for STIs.  After age 24: Get screened for STIs if you're at risk. You are at risk for STIs (including HIV) if:  You are sexually active with more than one person.  You don't use condoms every time.  You or a partner was diagnosed with a sexually transmitted infection.  If you are at risk for HIV, ask about PrEP medicine to prevent HIV.  Get tested for HIV at least once in your life, whether you are at risk for HIV or not.  Cancer screening tests  Cervical cancer screening: If you have a cervix, begin getting regular cervical cancer screening tests starting at age 21.  Breast cancer scan (mammogram): If you've  ever had breasts, begin having regular mammograms starting at age 40. This is a scan to check for breast cancer.  Colon cancer screening: It is important to start screening for colon cancer at age 45.  Have a colonoscopy test every 10 years (or more often if you're at risk) Or, ask your provider about stool tests like a FIT test every year or Cologuard test every 3 years.  To learn more about your testing options, visit:   .  For help making a decision, visit:   https://bit.ly/jc73434.  Prostate cancer screening test: If you have a prostate, ask your care team if a prostate cancer screening test (PSA) at age 55 is right for you.  Lung cancer screening: If you are a current or former smoker ages 50 to 80, ask your care team if ongoing lung cancer screenings are right for you.  For informational purposes only. Not to replace the advice of your health care provider. Copyright   2023 [facility] [employer]. All rights reserved. Clinically reviewed by the [facility] Transitions Program. [employer] 365110 - REV 01/24.

## 2025-06-20 NOTE — PROGRESS NOTES
"Preventive Care Visit  [facility]  Dona LIV Higgins CNP, Family Medicine  Jun 20, 2025  {Provider  Link to [facility] :[id number]}    Assessment & Plan     Routine general medical examination at a health care facility  ***  - REVIEW OF HEALTH MAINTENANCE PROTOCOL ORDERS    Class 2 severe obesity due to excess calories with serious comorbidity and body mass index (BMI) of 39.0 to 39.9 in adult (H)  ***    CARDIOVASCULAR SCREENING; LDL GOAL LESS THAN 160  ***  - Lipid panel reflex to direct LDL Fasting    Screening for diabetes mellitus (DM)  ***  - Comprehensive metabolic panel  - Hemoglobin A1c    Screening for disorder of blood and blood-forming organs  ***  - CBC with platelets    Screening for thyroid disorder  ***  - TSH with free T4 reflex    Cervical cancer screening  ***  - HPV and Gynecologic Cytology Panel - Recommended Age 30-65 Years      Patient has been advised of split billing requirements and indicates understanding: Yes        Nicotine/Tobacco Cessation  She reports that she has been smoking cigarettes. She started smoking about 33 years ago. She has a 16.5 pack-year smoking history. She has never used smokeless tobacco.  Nicotine/Tobacco Cessation Plan  {Nicotine/Tobacco Cessation Plan:003656}      BMI  Estimated body mass index is 37.44 kg/m  as calculated from the following:    Height as of this encounter: 1.689 m (5' 6.5\").    Weight as of this encounter: 106.8 kg (235 lb 8 oz).   Weight management plan: Discussed healthy diet and exercise guidelines  {FEMALE COUNSELING MESSAGES:960049::\"Reviewed preventive health counseling, as reflected in patient instructions\"}  Counseling  Appropriate preventive services were addressed with this patient via screening, questionnaire, or discussion as appropriate for fall prevention, nutrition, physical activity, Tobacco-use cessation, social engagement, weight loss and cognition.  Checklist reviewing preventive services available " has been given to the patient.  Reviewed patient's diet, addressing concerns and/or questions.   She is at risk for lack of exercise and has been provided with information to increase physical activity for the benefit of her well-being.         Follow-up    Follow-up Visit   Expected date:  Jun 20, 2026 (Approximate)      Follow Up Appointment Details:     Follow-up with whom?: PCP    Follow-Up for what?: Adult Preventive    How?: In Person                 Subjective   Tamika is a 50 year old, presenting for the following:  Weight Loss and Plantar Fascitis        6/20/2025    11:16 AM   Additional Questions   Roomed by Francisco   Accompanied by Self          HPI       {SUPERLIST (Optional):121197}  {additonal problems for provider to add (Optional):243960}  Advance Care Planning    Discussed advance care planning with patient; informed AVS has link to [facility].        6/20/2025   General Health   How would you rate your overall physical health? (!) FAIR   Feel stress (tense, anxious, or unable to sleep) Only a little   (!) STRESS CONCERN      6/20/2025   Nutrition   Three or more servings of calcium each day? Yes   Diet: Regular (no restrictions)   How many servings of fruit and vegetables per day? (!) 2-3   How many sweetened beverages each day? 0-1         6/20/2025   Exercise   Days per week of moderate/strenous exercise 3 days   Average minutes spent exercising at this level 10 min         6/20/2025   Social Factors   Frequency of gathering with friends or relatives More than three times a week   Worry food won't last until get money to buy more No   Food not last or not have enough money for food? No   Do you have housing? (Housing is defined as stable permanent housing and does not include staying outside in a car, in a tent, in an abandoned building, in an overnight shelter, or couch-surfing.) Yes   Are you worried about losing your housing? No   Lack of transportation? No   Unable to get utilities  (heat,electricity)? No         6/20/2025   Dental   Dentist two times every year? Yes         Today's PHQ-2 Score:       6/20/2025    11:16 AM   PHQ-2 ( 1999 [employer])   Q1: Little interest or pleasure in doing things 1   Q2: Feeling down, depressed or hopeless 1   PHQ-2 Score 2    Q1: Little interest or pleasure in doing things Several days   Q2: Feeling down, depressed or hopeless Several days   PHQ-2 Score 2       Patient-reported           6/20/2025   Substance Use   Alcohol more than 3/day or more than 7/wk No   Do you use any other substances recreationally? (!) CANNABIS PRODUCTS     Social History     Tobacco Use    Smoking status: Every Day     Current packs/day: 0.50     Average packs/day: 0.5 packs/day for 33.1 years (16.5 ttl pk-yrs)     Types: Cigarettes     Start date: 6/1/1992    Smokeless tobacco: Never    Tobacco comments:     Quite Date Set for 2/14/222   Vaping Use    Vaping status: Never Used   Substance Use Topics    Alcohol use: Yes     Comment: Occ. few times a month - couple of drinks    Drug use: No     {Provider  If there are gaps in the social history shown above, please follow the link to update and then refresh the note Link to Social and Substance History :924597}      5/9/2024   LAST FHS-7 RESULTS   1st degree relative breast or ovarian cancer No   Any relative bilateral breast cancer No   Any male have breast cancer No   Any ONE woman have BOTH breast AND ovarian cancer No   Any woman with breast cancer before 50yrs No   2 or more relatives with breast AND/OR ovarian cancer No   2 or more relatives with breast AND/OR bowel cancer No        Mammogram Screening - Mammogram every 1-2 years updated in Health Maintenance based on mutual decision making        6/20/2025   STI Screening   New sexual partner(s) since last STI/HIV test? No     History of abnormal Pap smear: YES - reflected in Problem List and Health Maintenance accordingly        Latest Ref Rng & Units 2/8/2022     3:04 PM  11/5/2020     2:38 PM 11/5/2020     2:00 PM   PAP / HPV   PAP  Negative for Intraepithelial Lesion or Malignancy (NILM)      PAP (Historical)   NIL     HPV 16 DNA Negative Negative   Negative    HPV 18 DNA Negative Negative   Negative    Other HR HPV Negative Negative   Negative      ASCVD Risk   The 10-year ASCVD risk score (Brayden JOHN, et al., 2019) is: 5.9%    Values used to calculate the score:      Age: 50 years      Sex: Female      Is Non- : No      Diabetic: No      Tobacco smoker: Yes      Systolic Blood Pressure: 114 mmHg      Is BP treated: No      HDL Cholesterol: 36 mg/dL      Total Cholesterol: 237 mg/dL        6/20/2025   Contraception/Family Planning   Questions about contraception or family planning No   What are your periods like? Regular        Reviewed and updated as needed this visit by Provider                    Past Medical History:   Diagnosis Date    Cervical high risk HPV (human papillomavirus) test positive 09/19/2019    See problem list     Past Surgical History:   Procedure Laterality Date    [facility] APPENDECTOMY  07/1995     Lab work is in process  Labs reviewed in EPIC  BP Readings from Last 3 Encounters:   06/20/25 114/82   06/18/24 119/70   03/24/23 133/71    Wt Readings from Last 3 Encounters:   06/20/25 106.8 kg (235 lb 8 oz)   06/18/24 111.1 kg (245 lb)   03/24/23 111.1 kg (244 lb 14.4 oz)                  Patient Active Problem List   Diagnosis    Tobacco use disorder    Hyperlipidemia LDL goal <160    Cervical high risk HPV (human papillomavirus) test positive    Class 2 severe obesity due to excess calories with serious comorbidity and body mass index (BMI) of 39.0 to 39.9 in adult (H)    Cerebral ventriculomegaly    Hydrocephalus in adult (H)    Prediabetes     Past Surgical History:   Procedure Laterality Date    [facility] APPENDECTOMY  07/1995       Social History     Tobacco Use    Smoking status: Every Day     Current packs/day: 0.50     Average  packs/day: 0.5 packs/day for 33.1 years (16.5 ttl pk-yrs)     Types: Cigarettes     Start date: 6/1/1992    Smokeless tobacco: Never    Tobacco comments:     Quite Date Set for 2/14/222   Substance Use Topics    Alcohol use: Yes     Comment: Occ. few times a month - couple of drinks     Family History   Problem Relation Age of Onset    Diabetes Father     Hypertension Father     Respiratory Maternal Grandmother         emphysema    Diabetes Maternal Grandmother     Cancer Paternal Grandmother         lung    Other Cancer Paternal Grandmother     Depression Sister          Current Outpatient Medications   Medication Sig Dispense Refill    FLUoxetine (PROZAC) 20 MG capsule TAKE 2 CAPSULES BY MOUTH EVERY  capsule 2    Semaglutide-Weight Management (WEGOVY) 1 MG/0.5ML pen Inject 1 mg subcutaneously once a week. 2 mL 0    nicotine (NICODERM CQ) 14 MG/24HR 24 hr patch PLACE 1 PATCH ONTO THE SKIN EVERY 24 HOURS. 28 patch 1     No Known Allergies    CONSTITUTIONAL:{:749822} has fatigue despite sleeping all night  INTEGUMENTARY/SKIN: NEGATIVE for worrisome rashes, moles or lesions  EYES: NEGATIVE for vision changes or irritation  ENT: POSITIVE for snoring and NEGATIVE for {:638191}  RESP:NEGATIVE for significant cough or SOB  BREAST: NEGATIVE for masses, tenderness or discharge  CV: NEGATIVE for chest pain, palpitations or peripheral edema  GI: NEGATIVE for nausea, abdominal pain, heartburn, or change in bowel habits   female: normal menses, no unusual urinary symptoms, and no unusual vaginal symptoms  MUSCULOSKELETAL:NEGATIVE for significant arthralgias or myalgia  NEURO: NEGATIVE for weakness, dizziness or paresthesias  ENDOCRINE: NEGATIVE for temperature intolerance, skin/hair changes  HEME/ALLERGY/IMMUNE: NEGATIVE for bleeding problems  PSYCHIATRIC: POSITIVE forHx anxiety, Hx depression, and doing well on the fluoxetine. Is some increased stress and now has to go into work 5 days a week and no more virtual.    "and NEGATIVE for{:675096}            Objective    Exam  /82 (BP Location: Right arm, Patient Position: Sitting, Cuff Size: Adult Large)   Pulse 89   Temp 97.5  F (36.4  C) (Tympanic)   Resp 16   Ht 1.689 m (5' 6.5\")   Wt 106.8 kg (235 lb 8 oz)   LMP 06/17/2025 (Exact Date)   SpO2 98%   BMI 37.44 kg/m     Estimated body mass index is 37.44 kg/m  as calculated from the following:    Height as of this encounter: 1.689 m (5' 6.5\").    Weight as of this encounter: 106.8 kg (235 lb 8 oz).    Wt Readings from Last 4 Encounters:   06/20/25 106.8 kg (235 lb 8 oz)   06/18/24 111.1 kg (245 lb)   03/24/23 111.1 kg (244 lb 14.4 oz)   06/24/22 111.1 kg (245 lb)      Physical Exam  {FEMALE - complete :835203}        Signed Electronically by: LIV Heath CNP    "

## 2025-06-20 NOTE — PROGRESS NOTES
Prior to immunization administration, verified patients identity using patient s name and date of birth. Please see Immunization Activity for additional information.     Screening Questionnaire for Adult Immunization    Are you sick today?   No   Do you have allergies to medications, food, a vaccine component or latex?   No   Have you ever had a serious reaction after receiving a vaccination?   No   Do you have a long-term health problem with heart, lung, kidney, or metabolic disease (e.g., diabetes), asthma, a blood disorder, no spleen, complement component deficiency, a cochlear implant, or a spinal fluid leak?  Are you on long-term aspirin therapy?   No   Do you have cancer, leukemia, HIV/AIDS, or any other immune system problem?   No   Do you have a parent, brother, or sister with an immune system problem?   No   In the past 3 months, have you taken medications that affect  your immune system, such as prednisone, other steroids, or anticancer drugs; drugs for the treatment of rheumatoid arthritis, Crohn s disease, or psoriasis; or have you had radiation treatments?   No   Have you had a seizure, or a brain or other nervous system problem?   No   During the past year, have you received a transfusion of blood or blood    products, or been given immune (gamma) globulin or antiviral drug?   No   For women: Are you pregnant or is there a chance you could become       pregnant during the next month?   No   Have you received any vaccinations in the past 4 weeks?   No     Immunization questionnaire answers were all negative.      Patient instructed to remain in clinic for 15 minutes afterwards, and to report any adverse reactions.     Screening performed by Behzad Pulido MA on 6/20/2025 at 12:20 PM.

## 2025-06-21 ENCOUNTER — RESULTS FOLLOW-UP (OUTPATIENT)
Dept: FAMILY MEDICINE | Facility: CLINIC | Age: 50
End: 2025-06-21

## 2025-06-21 DIAGNOSIS — R87.810 CERVICAL HIGH RISK HPV (HUMAN PAPILLOMAVIRUS) TEST POSITIVE: ICD-10-CM

## 2025-06-21 DIAGNOSIS — D72.829 LEUKOCYTOSIS, UNSPECIFIED TYPE: Primary | ICD-10-CM

## 2025-06-23 ENCOUNTER — PATIENT OUTREACH (OUTPATIENT)
Dept: CARE COORDINATION | Facility: CLINIC | Age: 50
End: 2025-06-23

## 2025-06-23 LAB
HPV HR 12 DNA CVX QL NAA+PROBE: NEGATIVE
HPV16 DNA CVX QL NAA+PROBE: NEGATIVE
HPV18 DNA CVX QL NAA+PROBE: NEGATIVE
HUMAN PAPILLOMA VIRUS FINAL DIAGNOSIS: NORMAL

## 2025-06-24 ENCOUNTER — ANCILLARY PROCEDURE (OUTPATIENT)
Dept: MAMMOGRAPHY | Facility: CLINIC | Age: 50
End: 2025-06-24
Attending: NURSE PRACTITIONER
Payer: COMMERCIAL

## 2025-06-24 DIAGNOSIS — Z12.31 ENCOUNTER FOR SCREENING MAMMOGRAM FOR BREAST CANCER: ICD-10-CM

## 2025-06-24 PROCEDURE — 77067 SCR MAMMO BI INCL CAD: CPT | Mod: TC | Performed by: RADIOLOGY

## 2025-06-24 PROCEDURE — 77063 BREAST TOMOSYNTHESIS BI: CPT | Mod: TC | Performed by: RADIOLOGY

## 2025-06-25 LAB
BKR AP ASSOCIATED HPV REPORT: NORMAL
BKR LAB AP GYN ADEQUACY: NORMAL
BKR LAB AP GYN INTERPRETATION: NORMAL
BKR LAB AP GYN OTHER FINDINGS: NORMAL
BKR LAB AP LMP: NORMAL
BKR LAB AP PREVIOUS ABNORMAL: NORMAL
PATH REPORT.COMMENTS IMP SPEC: NORMAL
PATH REPORT.COMMENTS IMP SPEC: NORMAL
PATH REPORT.RELEVANT HX SPEC: NORMAL

## 2025-07-16 ENCOUNTER — MYC REFILL (OUTPATIENT)
Dept: FAMILY MEDICINE | Facility: CLINIC | Age: 50
End: 2025-07-16
Payer: COMMERCIAL

## 2025-07-16 DIAGNOSIS — E66.01 CLASS 2 SEVERE OBESITY DUE TO EXCESS CALORIES WITH SERIOUS COMORBIDITY AND BODY MASS INDEX (BMI) OF 39.0 TO 39.9 IN ADULT (H): ICD-10-CM

## 2025-07-16 DIAGNOSIS — E66.812 CLASS 2 SEVERE OBESITY DUE TO EXCESS CALORIES WITH SERIOUS COMORBIDITY AND BODY MASS INDEX (BMI) OF 39.0 TO 39.9 IN ADULT (H): ICD-10-CM

## 2025-07-16 DIAGNOSIS — R73.03 PREDIABETES: ICD-10-CM

## 2025-08-12 ENCOUNTER — MYC REFILL (OUTPATIENT)
Dept: FAMILY MEDICINE | Facility: CLINIC | Age: 50
End: 2025-08-12
Payer: COMMERCIAL

## 2025-08-12 DIAGNOSIS — E66.812 CLASS 2 SEVERE OBESITY DUE TO EXCESS CALORIES WITH SERIOUS COMORBIDITY AND BODY MASS INDEX (BMI) OF 39.0 TO 39.9 IN ADULT (H): ICD-10-CM

## 2025-08-12 DIAGNOSIS — R73.03 PREDIABETES: ICD-10-CM

## 2025-08-12 DIAGNOSIS — E66.01 CLASS 2 SEVERE OBESITY DUE TO EXCESS CALORIES WITH SERIOUS COMORBIDITY AND BODY MASS INDEX (BMI) OF 39.0 TO 39.9 IN ADULT (H): ICD-10-CM

## 2025-08-28 DIAGNOSIS — F43.23 ADJUSTMENT DISORDER WITH MIXED ANXIETY AND DEPRESSED MOOD: ICD-10-CM
